# Patient Record
Sex: FEMALE | Race: BLACK OR AFRICAN AMERICAN | Employment: FULL TIME | URBAN - METROPOLITAN AREA
[De-identification: names, ages, dates, MRNs, and addresses within clinical notes are randomized per-mention and may not be internally consistent; named-entity substitution may affect disease eponyms.]

---

## 2017-06-15 LAB
ANTIBODY SCREEN, EXTERNAL: NORMAL
CHLAMYDIA, EXTERNAL: NORMAL
HBSAG, EXTERNAL: NORMAL
HIV, EXTERNAL: NORMAL
N. GONORRHEA, EXTERNAL: NORMAL
RUBELLA, EXTERNAL: NORMAL
T. PALLIDUM, EXTERNAL: NORMAL
TYPE, ABO & RH, EXTERNAL: NORMAL

## 2017-10-17 ENCOUNTER — HOSPITAL ENCOUNTER (INPATIENT)
Age: 22
LOS: 5 days | Discharge: HOME | DRG: 566 | End: 2017-10-24
Attending: OBSTETRICS & GYNECOLOGY | Admitting: OBSTETRICS & GYNECOLOGY
Payer: MEDICAID

## 2017-10-17 PROBLEM — Z34.90 PREGNANCY: Status: ACTIVE | Noted: 2017-10-17

## 2017-10-17 PROCEDURE — 96372 THER/PROPH/DIAG INJ SC/IM: CPT

## 2017-10-17 PROCEDURE — 74011250636 HC RX REV CODE- 250/636: Performed by: OBSTETRICS & GYNECOLOGY

## 2017-10-17 PROCEDURE — 74011250637 HC RX REV CODE- 250/637: Performed by: OBSTETRICS & GYNECOLOGY

## 2017-10-17 PROCEDURE — 59025 FETAL NON-STRESS TEST: CPT

## 2017-10-17 RX ORDER — BETAMETHASONE SODIUM PHOSPHATE AND BETAMETHASONE ACETATE 3; 3 MG/ML; MG/ML
12 INJECTION, SUSPENSION INTRA-ARTICULAR; INTRALESIONAL; INTRAMUSCULAR; SOFT TISSUE EVERY 24 HOURS
Status: DISCONTINUED | OUTPATIENT
Start: 2017-10-17 | End: 2017-10-19

## 2017-10-17 RX ORDER — AZITHROMYCIN 250 MG/1
500 TABLET, FILM COATED ORAL
Status: COMPLETED | OUTPATIENT
Start: 2017-10-17 | End: 2017-10-17

## 2017-10-17 RX ORDER — AZITHROMYCIN 250 MG/1
250 TABLET, FILM COATED ORAL DAILY
Status: DISCONTINUED | OUTPATIENT
Start: 2017-10-18 | End: 2017-10-24 | Stop reason: HOSPADM

## 2017-10-17 RX ADMIN — AZITHROMYCIN 500 MG: 250 TABLET, FILM COATED ORAL at 13:51

## 2017-10-17 RX ADMIN — BETAMETHASONE SODIUM PHOSPHATE AND BETAMETHASONE ACETATE 12 MG: 3; 3 INJECTION, SUSPENSION INTRA-ARTICULAR; INTRALESIONAL; INTRAMUSCULAR at 13:14

## 2017-10-17 NOTE — IP AVS SNAPSHOT
Summary of Care Report The Summary of Care report has been created to help improve care coordination. Users with access to [place] or [address] (Web-based application) may access additional patient information including the Discharge Summary. If you are not currently a [address] user and need more information, please call the number listed below in the [place] [address] section and ask to be connected with Medical Records. Facility Information Name Address Phone [address] [address] [phone] Patient Information Patient Name Sex  Steven Quintero ([id number]) Female 1995 Discharge Information Admitting Provider Service Area Unit Olayinka Hunter MD / [facility] 7[facility] Delivery / [phone] Discharge Provider Discharge Date/Time Discharge Disposition Destination (none) 10/24/2017 (Pending) AHR (none) Patient Language Language ENGLISH [13] Hospital Problems as of 10/24/2017  Reviewed: 10/14/2013 11:15 AM by Solo Munoz Class Noted - Resolved Last Modified POA Active Problems Pregnancy  10/17/2017 - Present 10/17/2017 by Declan Brown MD Unknown Entered by Declan Brown MD  
  Threatened  labor  10/19/2017 - Present 10/19/2017 by Olayinka Hunter MD Unknown Entered by Olayinka Hunter MD  
  
Non-Hospital Problems as of 10/24/2017  Reviewed: 10/14/2013 11:15 AM by Solo Munoz None You are allergic to the following No active allergies Current Discharge Medication List  
  
CONTINUE these medications which have NOT CHANGED Dose & Instructions Dispensing Information Comments PNV38-Iron Cbn&Gluc-FA-DSS-DHA 35-1- mg Cmpk Take  by mouth. Refills:  0 Current Immunizations Name Date DTAP Vaccine 2001, 3/31/2000, 1997, 2/10/1997 HIB Vaccine 3/31/2000, 1997, 1997, 2/10/1997 Hep A Vaccine 2 Dose Schedule (Ped/Adol) 10/14/2013 Hepatitis A Vaccine 10/2/2012 Hepatitis B Vaccine 3/31/2000, 1997, 1997, 2/10/1997 IPV 2001, 3/31/2000, 1997, 1997, 2/10/1997 Influenza Vaccine PF 10/14/2013 MMR Vaccine 2001, 2001, 3/31/2000 Meningococcal Vaccine 10/2/2012 TDAP Vaccine 2008 Varicella Virus Vaccine Live 10/2/2012, 2002, 2001 Follow-up Information Follow up With Details Comments Contact Info Keely Walters MD   [address] [facility] [address] [place] [id number] 
[phone] Discharge Instructions  Labor: Care Instructions Your Care Instructions  labor is the start of labor between 20 and 36 weeks of pregnancy. A full-term pregnancy lasts 37 to 42 weeks. In labor, the uterus contracts to open the cervix. This is the first stage of childbirth.  labor can be caused by a problem with the baby, the mother, or both. Often the cause is not known. In some cases, doctors use medicines to try to delay labor until 29 or more weeks of pregnancy. By this time, a baby has grown enough so that problems are not likely. In some casessuch as with a serious infectionit is healthier for the baby to be born early. Your treatment will depend on how far along you are in your pregnancy and on your health and your baby's health. Follow-up care is a key part of your treatment and safety. Be sure to make and go to all appointments, and call your doctor if you are having problems. It's also a good idea to know your test results and keep a list of the medicines you take. How can you care for yourself at home? · If your doctor prescribed medicines, take them exactly as directed.  Call your doctor if you think you are having a problem with your medicine. · Rest until your doctor advises you about activity. He or she will tell you if you should stay in bed most of the time. You may need to arrange for  if you have young children. · Do not have sexual intercourse unless your doctor says it is safe. · Use pads, not tampons, if you have vaginal bleeding. · Make sure to drink plenty of fluids. Dehydration can lead to contractions. If you have kidney, heart, or liver disease and have to limit fluids, talk with your doctor before you increase the amount of fluids you drink. · Do not smoke or allow others to smoke around you. If you need help quitting, talk to your doctor about stop-smoking programs and medicines. These can increase your chances of quitting for good. When should you call for help? Call 911 anytime you think you may need emergency care. For example, call if: 
· You passed out (lost consciousness). · You have severe vaginal bleeding. · You have severe pain in your belly or pelvis. · You have had fluid gushing or leaking from your vagina and you know or think the umbilical cord is bulging into your vagina. If this happens, immediately get down on your knees so your rear end (buttocks) is higher than your head. This will decrease the pressure on the cord until help arrives. Call your doctor now or seek immediate medical care if: 
· You have signs of preeclampsia, such as: 
¨ Sudden swelling of your face, hands, or feet. ¨ New vision problems (such as dimness or blurring). ¨ A severe headache. · You have any vaginal bleeding. · You have belly pain or cramping. · You have a fever. · You have had regular contractions (with or without pain) for an hour. This means that you have 6 or more within 1 hour after you change your position and drink fluids. · You have a sudden release of fluid from the vagina. · You have low back pain or pelvic pressure that does not go away. · You notice that your baby has stopped moving or is moving much less than normal. 
Watch closely for changes in your health, and be sure to contact your doctor if you have any problems. Where can you learn more? Go to http://[place]ia-nathalia.info/. Enter Q400 in the search box to learn more about \" Labor: Care Instructions. \" Current as of: 2017 Content Version: 11.3 © 3174-2923 [employer]. Care instructions adapted under license by [employer] (which disclaims liability or warranty for this information). If you have questions about a medical condition or this instruction, always ask your healthcare professional. [address] any warranty or liability for your use of this information. Chart Review Routing History Recipient Method Report Sent By Elke Helms MD  
Fax: [phone] Phone: [phone] Fax Mirtha Brennan MD NOTES AUTO ROUTING REPORT Ulises Perdomo MD [[id number]] 10/19/2017 11:14 AM 10/19/2017 Bacilio Helms MD  
Fax: [phone] Phone: [phone] Fax Mirtha Brennan MD NOTES AUTO ROUTING REPORT Ulises Perdomo MD [[id number]] 10/19/2017  6:00 PM 10/19/2017 Bacilio Helms MD  
Fax: [phone] Phone: [phone] Fax Mirtha Brennan MD NOTES AUTO ROUTING REPORT Shantelle Mccarthy MD [[id number]] 10/24/2017  2:32 PM 10/24/2017

## 2017-10-17 NOTE — PROGRESS NOTES
Admit this 23 yo G1 29 weeks with spontaneous twins from MD office for BMZ.  Consents signed and witnessed, patient oriented to L&D>

## 2017-10-17 NOTE — IP AVS SNAPSHOT
[address] [facility] 
[phone] Patient: Britni Ordaz MRN: [id number] :1995 My Medications TAKE these medications as instructed Instructions Each Dose to Equal  
 Morning Noon Evening Bedtime PNV38-Iron Cbn&Gluc-FA-DSS-DHA 35-1- mg Cmpk Your last dose was: Your next dose is: Take  by mouth.

## 2017-10-17 NOTE — PROGRESS NOTES
Bedside and Verbal shift change report given to Emi Storey RN (oncoming nurse) by Lyric Fischer RNC (offgoing nurse). Report included the following information SBAR, Intake/Output and Recent Results.

## 2017-10-17 NOTE — IP AVS SNAPSHOT
[address] [facility] 
[phone] Patient: Irina Xiao MRN: [id number] :1995 About your hospitalization You were admitted on:  2017 You last received care in the:  [facility] 3 LABOR & DELIVERY You were discharged on:  2017 Why you were hospitalized Your primary diagnosis was:  Not on File Your diagnoses also included:  Pregnancy, Threatened  Labor Things You Need To Do (next 8 weeks) Follow up with Lance Ayala MD  
  
Phone:  [phone] Where:  [address] [facility], [facility], [address] [place] [address] Discharge Orders None A check mak indicates which time of day the medication should be taken. My Medications TAKE these medications as instructed Instructions Each Dose to Equal  
 Morning Noon Evening Bedtime PNV38-Iron Cbn&Gluc-FA-DSS-DHA 35-1- mg Cmpk Your last dose was: Your next dose is: Take  by mouth. Discharge Instructions  Labor: Care Instructions Your Care Instructions  labor is the start of labor between 20 and 36 weeks of pregnancy. A full-term pregnancy lasts 37 to 42 weeks. In labor, the uterus contracts to open the cervix. This is the first stage of childbirth.  labor can be caused by a problem with the baby, the mother, or both. Often the cause is not known. In some cases, doctors use medicines to try to delay labor until 29 or more weeks of pregnancy. By this time, a baby has grown enough so that problems are not likely. In some casessuch as with a serious infectionit is healthier for the baby to be born early. Your treatment will depend on how far along you are in your pregnancy and on your health and your baby's health. Follow-up care is a key part of your treatment and safety.  Be sure to make and go to all appointments, and call your doctor if you are having problems. It's also a good idea to know your test results and keep a list of the medicines you take. How can you care for yourself at home? · If your doctor prescribed medicines, take them exactly as directed. Call your doctor if you think you are having a problem with your medicine. · Rest until your doctor advises you about activity. He or she will tell you if you should stay in bed most of the time. You may need to arrange for  if you have young children. · Do not have sexual intercourse unless your doctor says it is safe. · Use pads, not tampons, if you have vaginal bleeding. · Make sure to drink plenty of fluids. Dehydration can lead to contractions. If you have kidney, heart, or liver disease and have to limit fluids, talk with your doctor before you increase the amount of fluids you drink. · Do not smoke or allow others to smoke around you. If you need help quitting, talk to your doctor about stop-smoking programs and medicines. These can increase your chances of quitting for good. When should you call for help? Call 911 anytime you think you may need emergency care. For example, call if: 
· You passed out (lost consciousness). · You have severe vaginal bleeding. · You have severe pain in your belly or pelvis. · You have had fluid gushing or leaking from your vagina and you know or think the umbilical cord is bulging into your vagina. If this happens, immediately get down on your knees so your rear end (buttocks) is higher than your head. This will decrease the pressure on the cord until help arrives. Call your doctor now or seek immediate medical care if: 
· You have signs of preeclampsia, such as: 
¨ Sudden swelling of your face, hands, or feet. ¨ New vision problems (such as dimness or blurring). ¨ A severe headache. · You have any vaginal bleeding. · You have belly pain or cramping. · You have a fever. · You have had regular contractions (with or without pain) for an hour. This means that you have 6 or more within 1 hour after you change your position and drink fluids. · You have a sudden release of fluid from the vagina. · You have low back pain or pelvic pressure that does not go away. · You notice that your baby has stopped moving or is moving much less than normal. 
Watch closely for changes in your health, and be sure to contact your doctor if you have any problems. Where can you learn more? Go to http://[place]-nathalia.info/. Enter Q400 in the search box to learn more about \" Labor: Care Instructions. \" Current as of: 2017 Content Version: 11.3 © 0276-1640 [employer]. Care instructions adapted under license by [employer] (which disclaims liability or warranty for this information). If you have questions about a medical condition or this instruction, always ask your healthcare professional. [place] [address] any warranty or liability for your use of this information. Reelio Announcement We are excited to announce that we are making your provider's discharge notes available to you in Reelio. You will see these notes when they are completed and signed by the physician that discharged you from your recent hospital stay. If you have any questions or concerns about any information you see in Reelio, please call the Health Information Department where you were seen or reach out to your Primary Care Provider for more information about your plan of care. Introducing [facility]! Maria Luisa Zarco introduces Reelio patient portal. Now you can access parts of your medical record, email your doctor's office, and request medication refills online. 1. In your internet browser, go to [email or web address] 2. Click on the First Time User? Click Here link in the Sign In box.  You will see the New Member Sign Up page. 3. Enter your Koa.la Access Code exactly as it appears below. You will not need to use this code after youve completed the sign-up process. If you do not sign up before the expiration date, you must request a new code. · Koa.la Access Code: [email or web address] Expires: 1/22/2018  2:42 PM 
 
4. Enter the last four digits of your Social Security Number (xxxx) and Date of Birth (mm/dd/yyyy) as indicated and click Submit. You will be taken to the next sign-up page. 5. Create a Firefly Mobilet ID. This will be your Koa.la login ID and cannot be changed, so think of one that is secure and easy to remember. 6. Create a Koa.la password. You can change your password at any time. 7. Enter your Password Reset Question and Answer. This can be used at a later time if you forget your password. 8. Enter your e-mail address. You will receive e-mail notification when new information is available in [address] 9. Click Sign Up. You can now view and download portions of your medical record. 10. Click the Download Summary menu link to download a portable copy of your medical information. If you have questions, please visit the Frequently Asked Questions section of the Koa.la website. Remember, Koa.la is NOT to be used for urgent needs. For medical emergencies, dial 911. Now available from your iPhone and Android! Unresulted Labs-Please follow up with your PCP about these lab tests Order Current Status SAMPLE TO BLOOD BANK In process SAMPLE TO BLOOD BANK In process Providers Seen During Your Hospitalization Provider Specialty Primary office phone Fallon Jeffries MD Obstetrics & Gynecology [phone] Your Primary Care Physician (PCP) Primary Care Physician Office Phone Office Fax Violette Barone [phone] You are allergic to the following No active allergies Recent Documentation Height Weight Breastfeeding? BMI OB Status Smoking Status 1.575 m 76.2 kg Unknown 30.73 kg/m2 Pregnant Never Smoker Emergency Contacts Name Discharge Info Relation Home Work Mobile [address] CAREGIVER [3] Other Relative [6] [phone] Patient Belongings The following personal items are in your possession at time of discharge: 
  Dental Appliances: None  Visual Aid: None      Home Medications: None   Jewelry: None  Clothing: At bedside, Pants, Footwear, Shirt    Other Valuables: At bedside, Cell Phone Please provide this summary of care documentation to your next provider. Signatures-by signing, you are acknowledging that this After Visit Summary has been reviewed with you and you have received a copy. Patient Signature:  ____________________________________________________________ Date:  ____________________________________________________________  
  
Tyrone Carcamo Provider Signature:  ____________________________________________________________ Date:  ____________________________________________________________

## 2017-10-18 LAB
ERYTHROCYTE [DISTWIDTH] IN BLOOD BY AUTOMATED COUNT: 12.3 % (ref 11.5–14.5)
HCT VFR BLD AUTO: 31 % (ref 35–47)
HGB BLD-MCNC: 10 G/DL (ref 11.5–16)
MCH RBC QN AUTO: 28.6 PG (ref 26–34)
MCHC RBC AUTO-ENTMCNC: 32.3 G/DL (ref 30–36.5)
MCV RBC AUTO: 88.6 FL (ref 80–99)
PLATELET # BLD AUTO: 271 K/UL (ref 150–400)
RBC # BLD AUTO: 3.5 M/UL (ref 3.8–5.2)
WBC # BLD AUTO: 19.9 K/UL (ref 3.6–11)

## 2017-10-18 PROCEDURE — 85027 COMPLETE CBC AUTOMATED: CPT | Performed by: OBSTETRICS & GYNECOLOGY

## 2017-10-18 PROCEDURE — 74011250637 HC RX REV CODE- 250/637: Performed by: OBSTETRICS & GYNECOLOGY

## 2017-10-18 PROCEDURE — 96372 THER/PROPH/DIAG INJ SC/IM: CPT

## 2017-10-18 PROCEDURE — 99218 HC RM OBSERVATION: CPT

## 2017-10-18 PROCEDURE — 74011250636 HC RX REV CODE- 250/636: Performed by: OBSTETRICS & GYNECOLOGY

## 2017-10-18 PROCEDURE — 36415 COLL VENOUS BLD VENIPUNCTURE: CPT | Performed by: OBSTETRICS & GYNECOLOGY

## 2017-10-18 RX ORDER — LANOLIN ALCOHOL/MO/W.PET/CERES
1 CREAM (GRAM) TOPICAL
Status: DISCONTINUED | OUTPATIENT
Start: 2017-10-19 | End: 2017-10-24 | Stop reason: HOSPADM

## 2017-10-18 RX ORDER — SWAB
1 SWAB, NON-MEDICATED MISCELLANEOUS DAILY
Status: DISCONTINUED | OUTPATIENT
Start: 2017-10-18 | End: 2017-10-24 | Stop reason: HOSPADM

## 2017-10-18 RX ADMIN — Medication 1 TABLET: at 11:00

## 2017-10-18 RX ADMIN — BETAMETHASONE SODIUM PHOSPHATE AND BETAMETHASONE ACETATE 12 MG: 3; 3 INJECTION, SUSPENSION INTRA-ARTICULAR; INTRALESIONAL; INTRAMUSCULAR at 13:23

## 2017-10-18 RX ADMIN — AZITHROMYCIN 250 MG: 250 TABLET, FILM COATED ORAL at 11:35

## 2017-10-18 NOTE — PROGRESS NOTES
2305-Bedside report from [address] [place] [address], acre assumed at this time    2320-Assessment done, patient states no new complaints, ice pitcher refreshed at this time    0705-Bedside report to Cherelle Coelho RN, care turned over at this time

## 2017-10-18 NOTE — PROGRESS NOTES
0700 Bedside report received from Jose Manuel Acevedo RN and care assumed. 0745 Pt resting in bed in right lateral position with eyes shut. Respirations audible even and unlabored, no distress noted. 0900 Pt resting in bed watching TV. Pt denies any needs at this time. 1000 Pt OOB ambulated to bathroom without difficulty. 1100 Pt CBC and sample to blood bank drawn and sent to lab. Pt medicated with zithromax and prenatal vitamin per md orders. 1245 Pt sitting up in bed eating lunch. Pt denies any needs at this time. 1330 Pt medicated with betamethasone 12mg IM in right gluteal muscle. 1440 Pt resting in bed visiting with friends. Pt denies any needs at this time. 1500 Bedside report given to DOROTHY Herring RN and care turned over.

## 2017-10-18 NOTE — PROGRESS NOTES
Ante Partum Progress Note    Faiza Goodwin  [id number]    Assessment: 29w4d twins- vertex breech on US yesterday  Threatened pre-term labor with shortened cervix on US yesterday  Ureaplasma on vaginal culture    Plan:  Continue hospitalization with hospitalized bedrest   Complete  steroid course-2nd dose this afternoon   Ultrasound evaluation tomorrow with MFM   Ureaplasma-Azithromycin   Twins- desires attempt at vaginal delivery if twin A remains vertex   Anemia- on iron     Orders/Charges: Medium    Patient states she does not have headache , abdominal pain  , contractions, right upper quadrant pain  , vaginal bleeding , swelling, vaginal leaking of fluid  and reports active FM    Vitals:  Visit Vitals    /66 (BP 1 Location: Left arm, BP Patient Position: At rest)    Pulse 98    Temp 97.9 °F (36.6 °C)    Resp 18    Ht 5' 2\" (1.575 m)    Wt 76.2 kg (168 lb)    SpO2 98%    BMI 30.73 kg/m2     Temp (24hrs), Av °F (36.7 °C), Min:97.9 °F (36.6 °C), Max:98.1 °F (36.7 °C)      Last 24hr Input/Output:  No intake or output data in the 24 hours ending 10/18/17 0936         Exam:  Patient without distress. Abdomen, fundus soft non-tender     Extremities, no redness or tenderness               Additional Exam: Deferred    Labs:     No results found for: WBC, HGB, HCT, PLT, HGBEXT, HCTEXT, PLTEXT    No results found for this or any previous visit (from the past 24 hour(s)).

## 2017-10-19 PROBLEM — O47.00 THREATENED PRETERM LABOR: Status: ACTIVE | Noted: 2017-10-19

## 2017-10-19 PROCEDURE — 74011250637 HC RX REV CODE- 250/637: Performed by: OBSTETRICS & GYNECOLOGY

## 2017-10-19 PROCEDURE — 74011250636 HC RX REV CODE- 250/636: Performed by: OBSTETRICS & GYNECOLOGY

## 2017-10-19 PROCEDURE — 75410000002 HC LABOR FEE PER 1 HR

## 2017-10-19 PROCEDURE — 65410000002 HC RM PRIVATE OB

## 2017-10-19 PROCEDURE — 99218 HC RM OBSERVATION: CPT

## 2017-10-19 RX ORDER — MAGNESIUM SULFATE HEPTAHYDRATE 40 MG/ML
4 INJECTION, SOLUTION INTRAVENOUS ONCE
Status: COMPLETED | OUTPATIENT
Start: 2017-10-19 | End: 2017-10-19

## 2017-10-19 RX ORDER — SODIUM CHLORIDE 9 MG/ML
INJECTION INTRAMUSCULAR; INTRAVENOUS; SUBCUTANEOUS
Status: DISPENSED
Start: 2017-10-19 | End: 2017-10-20

## 2017-10-19 RX ORDER — CALCIUM GLUCONATE 94 MG/ML
1 INJECTION, SOLUTION INTRAVENOUS AS NEEDED
Status: DISCONTINUED | OUTPATIENT
Start: 2017-10-19 | End: 2017-10-22 | Stop reason: ALTCHOICE

## 2017-10-19 RX ORDER — MAGNESIUM SULFATE HEPTAHYDRATE 40 MG/ML
2 INJECTION, SOLUTION INTRAVENOUS ONCE
Status: COMPLETED | OUTPATIENT
Start: 2017-10-19 | End: 2017-10-19

## 2017-10-19 RX ORDER — SODIUM CHLORIDE, SODIUM LACTATE, POTASSIUM CHLORIDE, CALCIUM CHLORIDE 600; 310; 30; 20 MG/100ML; MG/100ML; MG/100ML; MG/100ML
75 INJECTION, SOLUTION INTRAVENOUS CONTINUOUS
Status: DISCONTINUED | OUTPATIENT
Start: 2017-10-19 | End: 2017-10-22 | Stop reason: ALTCHOICE

## 2017-10-19 RX ADMIN — MAGNESIUM SULFATE HEPTAHYDRATE 4 G: 40 INJECTION, SOLUTION INTRAVENOUS at 16:15

## 2017-10-19 RX ADMIN — FERROUS SULFATE TAB 325 MG (65 MG ELEMENTAL FE) 325 MG: 325 (65 FE) TAB at 08:05

## 2017-10-19 RX ADMIN — Medication 2 G/HR: at 22:37

## 2017-10-19 RX ADMIN — Medication 2 G/HR: at 17:02

## 2017-10-19 RX ADMIN — Medication 1 TABLET: at 08:05

## 2017-10-19 RX ADMIN — AZITHROMYCIN 250 MG: 250 TABLET, FILM COATED ORAL at 08:05

## 2017-10-19 RX ADMIN — SODIUM CHLORIDE, SODIUM LACTATE, POTASSIUM CHLORIDE, AND CALCIUM CHLORIDE 75 ML/HR: 600; 310; 30; 20 INJECTION, SOLUTION INTRAVENOUS at 16:15

## 2017-10-19 RX ADMIN — MAGNESIUM SULFATE HEPTAHYDRATE 2 G: 40 INJECTION, SOLUTION INTRAVENOUS at 16:31

## 2017-10-19 NOTE — PROGRESS NOTES
Ante Partum Progress Note    Sam Coelho  [id number]    Assessment: 34w10d jesús twins - vertex/breech    Short cervix - 9mm on US 10/17    Ureaplasma on vaginal culture      Plan:  Continue hospitalization with hospitalized bedrest             Sp betamethasone course   Ultrasound today to eval cervix - If cervix stable will plan dc home with bedrest              Continue 5 day course of azithromycin   Anemia on iron     Orders/Charges: Medium    Patient states she has no new complaints. She denies any contractions, vaginal bleeding, leakage of fluid. Vitals:  Visit Vitals    /69 (BP 1 Location: Right arm, BP Patient Position: At rest)    Pulse 93    Temp 98.3 °F (36.8 °C)    Resp 16    Ht 5' 2\" (1.575 m)    Wt 76.2 kg (168 lb)    SpO2 98%    Breastfeeding Unknown    BMI 30.73 kg/m2     Temp (24hrs), Av.1 °F (36.7 °C), Min:97.7 °F (36.5 °C), Max:98.3 °F (36.8 °C)      Last 24hr Input/Output:  No intake or output data in the 24 hours ending 10/19/17 1113     Non stress test:  Reactive    Patient Vitals for the past 4 hrs: Mode Fetal Heart Rate Fetal Activity Variability Decelerations Accelerations RN Reviewed Strip? Non Stress Test   10/19/17 0840 External 140 Present 6-25 BPM None Yes Yes Reactive    Patient Vitals for the past 4 hrs: Mode Fetal Heart Rate Fetal Activity Variability Decelerations Accelerations RN Reviewed Strip? Non Stress Test   10/19/17 0840 External 140 Present 6-25 BPM None Yes Yes Reactive        Uterine Activity: One contraction in 30 min    Exam:  Patient without distress.      Abdomen, fundus soft non-tender     Extremities, no redness or tenderness               Additional Exam: Deferred    Labs:     Lab Results   Component Value Date/Time    WBC 19.9 10/18/2017 11:14 AM    HGB 10.0 10/18/2017 11:14 AM    HCT 31.0 10/18/2017 11:14 AM    PLATELET 913  11:14 AM       Recent Results (from the past 24 hour(s))   CBC W/O DIFF    Collection Time: 10/18/17 11:14 AM Result Value Ref Range    WBC 19.9 (H) 3.6 - 11.0 K/uL    RBC 3.50 (L) 3.80 - 5.20 M/uL    HGB 10.0 (L) 11.5 - 16.0 g/dL    HCT 31.0 (L) 35.0 - 47.0 %    MCV 88.6 80.0 - 99.0 FL    MCH 28.6 26.0 - 34.0 PG    MCHC 32.3 30.0 - 36.5 g/dL    RDW 12.3 11.5 - 14.5 %    PLATELET 222 259 - 502 K/uL

## 2017-10-19 NOTE — PROGRESS NOTES
1900-Bedside report from [address] [facility], care assumed at this time.  Patient on monitor for NST, NST reactive for gestation, assessment done, no new complaints, FHR baby \"A\" 130 BPM and baby \"B\" 135 BPM    0705-Bedside report to HOLDEN Horowitz RN, care turned over at this time

## 2017-10-19 NOTE — PROGRESS NOTES
Patient examined at approximately 6pm  Cervix remains closed, very soft, 70%  FHT cat I x 2  Contractions q 12-20 min, not appreciated by patient  Reviewed if labors, at this gestational age, with most recent EFW 26g and 1069g on 10/4, that I would recommend delivery via c/s given the most recent documented EFW <1500g and my and Dr Lopez comfort with breech extraction. She agrees with c/s if labors.

## 2017-10-19 NOTE — PROGRESS NOTES
1600: Pt transferred to  3163 to start magnesium per Dr. Nannette Angela. 1615: Magnesium 6 gm loading dose started. No adverse reactions noted.

## 2017-10-19 NOTE — PROGRESS NOTES
Initial Nutrition Assessment:    INTERVENTIONS/RECOMMENDATIONS:   · Meals/Snacks: General/healthful diet: Continue current diet    ASSESSMENT:   Patient medically noted for pregnancy with twins and threatened  labor. Receiving a L&D diet. Encourage intake of meals, snacks, and fluids. Diet Order:  (L&D)  % Eaten:  No data found. Pertinent Medications: [x]Reviewed []Other: Ferrous sulfate, Mg Sulfate, Prenatal MVI  Pertinent Labs: [x]Reviewed []Other:   Food Allergies: [x]None []Other   Last BM: 10/17  [x]Active     []Hyperactive  []Hypoactive       [] Absent BS  Skin:    [x] Intact   [] Incision  [] Breakdown  [] Other:    Anthropometrics:   Height: 5' 2\" (157.5 cm) Weight: 76.2 kg (168 lb)   IBW (%IBW):   ( ) UBW (%UBW):   (  %)   Last Weight Metrics:  Weight Loss Metrics 10/17/2017 10/14/2013 10/2/2012   Today's Wt 168 lb 129 lb 122 lb 4 oz   BMI 30.73 kg/m2 23.53 kg/m2 22.96 kg/m2       BMI: Body mass index is 30.73 kg/(m^2). This BMI is indicative of:   []Underweight    []Normal    []Overweight    [x] Obesity   [] Extreme Obesity (BMI>40)     Estimated Nutrition Needs (Based on):   2428 Kcals/day (BMR (1483) x 1.3AF +500kcal) , 99 g (1.3 g/kg bw) Protein  Carbohydrate:  At Least 130 g/day  Fluids: 2400 mL/day (1ml/kcal)    Pt expected to meet estimated nutrient needs: [x]Yes []No    NUTRITION DIAGNOSES:   Problem:  Increased nutrient needs      Etiology: related to pregnancy     Signs/Symptoms: as evidenced by Twins      NUTRITION INTERVENTIONS:  Meals/Snacks: General/healthful diet                  GOAL:   PO intake >70% of meals/snacks next 5-7 days    LEARNING NEEDS (Diet, Food/Nutrient-Drug Interaction):    [x] None Identified   [] Identified and Education Provided/Documented   [] Identified and Pt declined/was not appropriate     Cultureal, Mu-ism, OR Ethnic Dietary Needs:    [x] None Identified   [] Identified and Addressed     [x] Interdisciplinary Care Plan Reviewed/Documented    [x] Discharge Planning:  Regular diet     MONITORING /EVALUATION:   Food/Nutrient Intake Outcomes:  Total energy intake  Physical Signs/Symptoms Outcomes: Weight/weight change    NUTRITION RISK:    [] High              [] Moderate           [x]  Low  []  Minimal/Uncompromised    PT SEEN FOR:    []  MD Consult: []Calorie Count      []Diabetic Diet Education        []Diet Education     []Electrolyte Management     []General Nutrition Management and Supplements     []Management of Tube Feeding     []TPN Recommendations    [x]  RN Referral:  []MST score >=2     []Enteral/Parenteral Nutrition PTA     [x]Pregnant: Gestational DM or Multigestation     []Pressure Ulcer/Wound Care needs        []  Low BMI  []  DTR Referral       [employer]  Pager [phone]                 Weekend Pager [phone]

## 2017-10-19 NOTE — H&P
ADDENDUM:  Chart reviewed - states twin B with cystic hygroma and bowel herniation - this was noted at 11 week scan and anatomy was normal at all follow up visits other than umbilical cord cyst. [id number] neg, twin boys. EDC:2017  EGA: 29 weeks, 3 days      Vital Signs   22Years Old Female  Weight: 168 pounds  BP:       116/72    Pap/HPV/Gardasil History   History of abnormal pap: no  Gardasil Injection History: Declined    Patient's Prenatal Care with Doctor of Record Haroon Swenson MD Notable For -    Anemia on FE pregnant  Shortened cervix 17mm 27wks   lab screening  Abnormality in fetus know or suspected. twin B, cystic hygroma, bowel herniation-MaterniT21-screen neg  Twins dichorionic/diamniotic              Past Pregnancy History      :  1    Pregnancy # 1     Comments:  current-di/di twins-spontaneous        Allergies    This patient has no known allergies. Medications Removed from Medication List        [address] for Follow-up Visit     Estimated weeks of        gestation:  29 3/7     Weight:  168     Blood pressure: 116 / 72     Urine Protein:  N     Urine Glucose: N     Headache:  No     Nausea/vomiting: No     Edema:  0     Vaginal bleeding: no     Vaginal discharge: d/c     Fetal activity:  yes x 2     FHR:   150/135     Labor symptoms: no     Fetal position:  cephalic/breech     Cx Dilation:  0     Cx Effacement: 80%     Cx Station:  ballotable     Preceptor:  aym     Comment:  normal glucola. Anemia   add daily iron  US today 9mm CL with Y-funnel. Pror neg FFN. closed cvx today. soft and paper thin    Prior ureaplasm +, will treat. did not pickup prior rx. admit for BMZ repeat CL on Thursday         Fetal Surveillance      NST Fetus A  An NST was performed and was reactive. The baseline FHR was 140 and regular. Moderate variablity was present. Three accelerations of sufficient amplitude and duration were noted. There were no decelerations noted.      NST Fetus B  An NST was performed and was reactive. The baseline FHR was 150 and regular. Moderate variablity was present. Three accelerations of sufficient amplitude and duration were noted. no contractions on monitor . Impression & Recommendations:    Problem # 1:  Shortened cervix 17mm 27wks (ICD-649.73) (RFP43-B73.873)  CL now 9mm with Y-shaped funnel   closed on exam   But soft and thin   Prior neg FFN 1 week ago   ureaplasm +, will treat with PO antibitoics   Admit to L&D for  steriods   Plan repeat CL on Thursday   Discussed expectation for home on bedrest at time of discharge   Discussed with Dr Lester Sanchez, agrees with plan  report called to charge nurse. Dr Cain Caballero aware     Orders:  Fetal non-stress test (CPT-19021B)  Fetal non-stress test twins ([id number])  Antepartum Care (CPT-10)  Patient Sent to L&D (CPT-LD)  Sent to L&D  (CPT-AdmitF)      Problem # 2:  Anemia on FE pregnant (ICD-648.23) (GRA64-Y20.013)  Hgb 9.4  Start iron daily   continue prenatal vitamin         Medications (at conclusion of this visit)    08/15/2017 PNV-SELECT 27-0.6-0.4 MG ORAL TABS (PRENATAL VIT W/FU-[id number]-XT) one tablet orally every day  06/15/2017 DICLEGIS 10-10 MG TBEC (DOXYLAMINE-PYRIDOXINE) two tabs qhs, may add one in the am and one in the afternoon if needed          CC:  short cervix . History of Present Illness:  CL 9mm, twin pregnnacy    Admit to antepartum for  steriod course. Plan repeat CL on Thursday   If stable can be discharged and done as outpatient Otherwise will scan as inpatient   recent ureaplasm +, needs PO antibiotics to treat   US today cephalic/breech.  Desires vaginal delivery if labors           Past Medical History:     Reviewed history from 06/15/2017 and no changes required:        Negative     Past Surgical History:     Reviewed history from 06/15/2017 and no changes required:        Negative     Family History Summary:      Reviewed history Last on 2017 and no changes required:10/17/2017      General Comments - FH:  Family history transferred to 2 compliant     Social History:     Reviewed history from 06/15/2017 and no changes required:        Single      Risk Factors:     PAP Smear History:      Date of Last PAP Smear:  06/15/2017     Results:  Deferred     Previous Tobacco Use: Signed On - 06/15/2017  Smoked Tobacco Use:  Never smoker  Smokeless Tobacco Use:  Never  Passive smoke exposure:  no  Drug use:  no  Caffeine use:  <! drinks per day    Previous Alcohol Use: Signed On - 06/15/2017  Alcohol use:  no  Exercise:  no  Seatbelt use:  preg- %  Sun Exposure:  rarely    PAP Smear History:      Date of Last PAP Smear:  06/15/2017     Results:  Deferred       Review of Systems        See HPI    Except as noted in the HPI, the review of systems is negative for General, Breast, , Resp, GI, Endo, MS, Psych and Heme.       Vital Signs    Blood Pressure: 116 / 72  Contractions:  no  NST:   reactive     Weight:  168 pounds      Physical Exam     General           General appearance:  no acute distress    Head           Inspection:   normal    Eyes           External:   EOM intact    ENT           Dental:   adequate dentition    Chest           Lungs:  clear to auscultation          Heart:  regular rate and rhythm    Extremeties           Extremeties:  0 edema    Neurological           Reflexes:  2+ and symmetric with no pathological reflexes    Psych           Orientation:  oriented to time, place, and person          Mood:  no appearance of anxiety, depression, or agitation    Lymph           Inguinal:  no inguinal adenopathy    Skin           Inspection:  no rashes, suspicious lesions, or ulcerations    Abdomen           Abdomen:  gravid--twins     Pelvic Exam           EGBUS:  no lesions          Vagina:  thin white discharge           Uterus:  gravid          Cervix:  no lesions or discharge                  Dilation: : 0                  Effacement:  80% Station:  ballotable                  Presentation:  cephalic/breech                  Membranes:  intact                  Pelvis:  adequate                  Consistency:  soft                  Position: anterior    Other Physical Exam Findings           US today reviewed     Allergies    This patient has no known allergies. Medications Removed from Medication List        Impression & Recommendations:    Problem # 1:  Shortened cervix 17mm 27wks (ICD-649.73) (DPQ78-S91.873)  CL now 9mm with Y-shaped funnel   closed on exam   But soft and thin   Prior neg FFN 1 week ago   ureaplasm +, will treat with PO antibitoics   Admit to L&D for  steriods   Plan repeat CL on Thursday   Discussed expectation for home on bedrest at time of discharge   Discussed with Dr Su, agrees with plan  report called to charge nurse. Dr Heena Kramer aware     Orders:  Fetal non-stress test (CPT-61968D)  Fetal non-stress test twins ([id number])  Antepartum Care (CPT-10)  Patient Sent to L&D (CPT-LD)  Sent to L&D  (CPT-AdmitF)      Problem # 2:  Anemia on FE pregnant (ICD-648.23) (PSE85-T13.013)  Hgb 9.4  Start iron daily   continue prenatal vitamin         Medications (at conclusion of this visit)    08/15/2017 PNV-SELECT 27-0.6-0.4 MG ORAL TABS (PRENATAL VIT W/BE-QVRDPTCMR-IJ) one tablet orally every day  06/15/2017 DICLEGIS 10-10 MG TBEC (DOXYLAMINE-PYRIDOXINE) two tabs qhs, may add one in the am and one in the afternoon if needed          LABORATORY DATA   TEST DATE RESULT   Group B Strep culture                                     (Group B Strep Culture Result Field)   Blood Type 06/15/2017 O                                             (Blood Type Result Field)   Rh 06/15/2017 Positive                                   (Rh Result Field)   Rhogam Inj Given     Tdap Vaccine Given 10/10/2017 Vacc.  [address]   Antibody Screen 10/10/2017 Negative   Rubella  [facility] Reference Ranges On or After 3/10/14                  <0.90 Non-immune      0.90 - 0.99     Equivocal      >0.99              Immune    [facility] Reference Ranges  Before 3/10/14           <5                 Non-immune             5 - 9               Equivocal            >9                 Immune  [facility] Reference Ranges       < Or = 0.90       Negative             0.91-1.09          Equivocal            > Or = 1.10       Positive   06/15/2017     5.94     TPA (T Pallidum Antibodies) 10/10/2017 Negative   Serology (RPR)     HBsAg 06/15/2017 Negative   HIV 06/15/2017 Non Reactive   Hemoglobin 10/10/2017 9.7   Hematocrit 10/10/2017 29.3   Platelets 30/57/8756 252 X10E3/UL   TSH     Urine Culture 06/15/2017 Negative   GC DNA Probe 06/15/2017 Negative   Chlamydia DNA 06/15/2017 Negative   PAP     Flu Vaccine Given 09/12/2017 Vacc.  VWC   HGBA1C     HGB Electro 06/15/2017 AA   T4, Free     BG Fasting     GTT 1H 50G 10/10/2017 111   GTT 1H 100G     GTT 2H 100G     GTT 3H 100G     Glucose Plasma     CF Accept or Decline 06/21/2017 declined   CF Screen Result     Nuchal Trans 06/21/2017 2.3^2.3 mm&millimeters   AFP Only 08/15/2017 *Screen Negative*   Tetra     AFP Serum     CVS 06/21/2017 declined   AFP Amniotic     Amnio Karyo     FISH     GC Culture     Chlamydia Cult     Ureaplasma 10/04/2017 Positive   Mycoplasma 10/04/2017 Negative   WBC 06/15/2017 12.2 X10E3/UL   RBC 06/15/2017 3.95 X10E6/UL   MCV 06/15/2017 89   MCH 06/15/2017 29.4   MCHC RBC 06/15/2017 33.0     ULTRASOUND DATA   TEST DATE RESULT   Estimated Fetal Weight 10/04/2017 6120.3720137^8498 g&grams                                     Weight % 10/04/2017 31^31% %&percent                                                SANTO                      BPP 10/10/2017 8^8 [n/a]&Not applicable   Cervical Length (mm)       ]      Electronically signed by Beltran Daigle MD on 10/17/2017 at 11:25 AM    ________________________________________________________________________

## 2017-10-19 NOTE — PROGRESS NOTES
Bedside shift change report given to Carole Hernandez RN by SURYA Medellin RN. Report included the following information SBAR, Kardex, Intake/Output, MAR, Accordion, Recent Results and Med Rec Status. Hourly Rounding performed by JARROD.   Casey Broderick RN

## 2017-10-19 NOTE — PROGRESS NOTES
0840: NST reactive for twins, but at times appeared to be only recording baby A.  Will try NST after patient eats breakfast.

## 2017-10-19 NOTE — PROGRESS NOTES
1914 - Bedside and Verbal shift change report given to Lea Haddad RN (oncoming nurse) by SURYA Leblanc RN (offgoing nurse). Report included the following information SBAR, Intake/Output, MAR and Recent Results. 1918 - Pt w/out complaint at this time and visiting with a friend now. 2014 - Noted increased frequency of contractions on monitor and by palpation. Pt states she feels no pain, pressure, or discomfort. Encouraged pt to void soon if she feels the urge. Will continue to monitor closely. Dr. Meera Schrader aware. 2117 - Pt sitting up to eat. 2143 - Pt OOB to use bedside commode. Voided 380ml. 2313 - Bedside and Verbal shift change report given to SURYA Siu RN (oncoming nurse) by Lea Haddad RN (offgoing nurse). Report included the following information SBAR, Intake/Output, MAR and Recent Results.

## 2017-10-19 NOTE — PROGRESS NOTES
US shows further shortening of cervix to 4mm  NST with irritability and intermittent contractions  Has not yet had neuroprotective magnesium  Given worsening of TVCL and irritability will plan Mg for neuroprotection  Will get NICU consult

## 2017-10-20 PROCEDURE — 75410000002 HC LABOR FEE PER 1 HR

## 2017-10-20 PROCEDURE — 65410000002 HC RM PRIVATE OB

## 2017-10-20 PROCEDURE — 74011250637 HC RX REV CODE- 250/637: Performed by: OBSTETRICS & GYNECOLOGY

## 2017-10-20 PROCEDURE — 74011250636 HC RX REV CODE- 250/636: Performed by: OBSTETRICS & GYNECOLOGY

## 2017-10-20 RX ORDER — SODIUM CHLORIDE 0.9 % (FLUSH) 0.9 %
SYRINGE (ML) INJECTION
Status: COMPLETED
Start: 2017-10-20 | End: 2017-10-20

## 2017-10-20 RX ADMIN — Medication 2 G/HR: at 03:26

## 2017-10-20 RX ADMIN — Medication 1 TABLET: at 13:21

## 2017-10-20 RX ADMIN — SODIUM CHLORIDE, SODIUM LACTATE, POTASSIUM CHLORIDE, AND CALCIUM CHLORIDE 75 ML/HR: 600; 310; 30; 20 INJECTION, SOLUTION INTRAVENOUS at 03:27

## 2017-10-20 RX ADMIN — Medication 10 ML: at 22:37

## 2017-10-20 RX ADMIN — FERROUS SULFATE TAB 325 MG (65 MG ELEMENTAL FE) 325 MG: 325 (65 FE) TAB at 13:21

## 2017-10-20 RX ADMIN — AZITHROMYCIN 250 MG: 250 TABLET, FILM COATED ORAL at 13:21

## 2017-10-20 NOTE — PROGRESS NOTES
Report received from Anel Ladd RN. Care assumed at this time. 6555- Adjusting all three monitors. Pt desired to change positions    2730 Report given to CHINO Li. Care turned over at this time.

## 2017-10-20 NOTE — PROGRESS NOTES
Late entry:  H&P by Dr. Ene Mckenzie entered on 10/17. Pt seen and examined, chart reviewed. PMH and PSH as well as social and family history are reviewed. She is admitted for shortened cervix with twin gestation at 29 weeks 2 days. Visit Vitals    /76    Pulse 89    Temp 97.7 °F (36.5 °C)    Resp 18    Ht 5' 2\" (1.575 m)    Wt 76.2 kg (168 lb)    SpO2 99%    Breastfeeding Unknown    BMI 30.73 kg/m2      NST is reactive x 2, there are no contractions. Lungs CTAB  CV RRR  Abd gravid, nontender  Ext no edema. Cx not eacxmined, found to be 9 mm with funneling in office on US day of admission. 21 yo G1 at 29 2/7 on admission with twin pregnancy and shortened cervix. Begin BMZ, hold on tocolysis due to no contractile activity. General diet, bedrest with BRP.     Azithro for ureaplasma culture on previous admission

## 2017-10-20 NOTE — PROGRESS NOTES
Bedside shift change report given to JARROD Martinez by SURYA Weldon RN. Report included the following information SBAR, Kardex, Intake/Output, MAR, Accordion, Recent Results and Med Rec Status. Hourly Rounding performed by JARROD.   Nancy Lowry RN

## 2017-10-20 NOTE — PROGRESS NOTES
Ante Partum Progress Note    Faithcheli Kelley  24M4E    Assessment: 29w6d   Twins, short cervix, threatened ptl s/p bmz and magnesium for neuro prophylaxis    Plan:  Continue hospitalization with hospitalized bedrest and Maternal fetal medicine consultation next week on Tuesday. Orders/Charges: Medium and Non Stress Test  X 2    Patient states she has no new complaints    Vitals:  Visit Vitals    /65    Pulse 99    Temp 97.8 °F (36.6 °C)    Resp 16    Ht 5' 2\" (1.575 m)    Wt 76.2 kg (168 lb)    SpO2 100%    Breastfeeding Unknown    BMI 30.73 kg/m2     Temp (24hrs), Av.9 °F (36.6 °C), Min:97.7 °F (36.5 °C), Max:98.4 °F (36.9 °C)      Last 24hr Input/Output:    Intake/Output Summary (Last 24 hours) at 10/20/17 1755  Last data filed at 10/20/17 1323   Gross per 24 hour   Intake          2550.42 ml   Output             2080 ml   Net           470.42 ml        Non stress test:  Reactive  An NST was performed and was reactive x2. The baseline FHR was appropriate Moderate baseline  variability was noted. Accelerations of sufficient amplitude and duration were noted. There were no decelerations noted. No data found. No data found. Uterine Activity: None     Exam:  Patient without distress. Abdomen, fundus soft non-tender     Extremities, no redness or tenderness               Additional Exam: Deferred    Labs:     Lab Results   Component Value Date/Time    WBC 19.9 10/18/2017 11:14 AM    HGB 10.0 10/18/2017 11:14 AM    HCT 31.0 10/18/2017 11:14 AM    PLATELET 500 3217 11:14 AM       No results found for this or any previous visit (from the past 24 hour(s)).

## 2017-10-20 NOTE — CONSULTS
Neonatology Antepartum Consultation    Name: Aubrey Ramon      Medical Record Number: [id number]      YOB: 1995     Today's Date: 2017                                                                 Date of Consultation:  2017  Time: 8:03 PM  Attending MD: faith  Referring Physician: Bebe  Reason for Consultation:  Premature twins    Subjective:     Age: 22 y.o.  Saumya Washington  Para:   Gestation age: 34w10d      Maternal steroids:  yes     Objective:     Medications:   Current Facility-Administered Medications   Medication Dose Route Frequency    magnesium sulfate 10 gm/250 mL D5W infusion  2 g/hr IntraVENous CONTINUOUS    calcium gluconate injection 1 g  1 g IntraVENous PRN    sodium chloride 0.9 % injection        lactated Ringers infusion  75 mL/hr IntraVENous CONTINUOUS    prenatal vit-iron fumarate-fa (PRENATAL PLUS with IRON) tablet 1 Tab  1 Tab Oral DAILY    ferrous sulfate tablet 325 mg  1 Tab Oral DAILY WITH BREAKFAST    azithromycin (ZITHROMAX) tablet 250 mg  250 mg Oral DAILY     Rupture of Membrane: Membranes  Membrane Status: Intact   Meconium Stained:       Data Review:  Lab:   Lab Results   Component Value Date/Time    Rubella, External 5.94 IMM 06/15/2017    HBsAg, External neg 06/15/2017    HIV, External neg 06/15/2017    Gonorrhea, External neg 06/15/2017    Chlamydia, External neg 06/15/2017    ABO,Rh O pos 06/15/2017    Antibody screen, External neg 06/15/2017     Last Ultrasound:{  Last Estimated Fetal Weight: 2.5 Lbs per mom    Assessment:  Twins at 34 5/7 weeks. Spoke with mom concerning potential respiratory issues,feeding issues,IVH and routine care of premature newborns. She voiced no questions at this time. Time spent face to face 15 minutes.   Time spent reviewing chart  And discussing patient with Dr. Makeda Rodriguez minutes      Active Problems:    Pregnancy (10/17/2017)      Threatened  labor (10/19/2017)        OB Concerns/Plan: Approximate survival statistics in overall population at this Gestation Age 34w10d    [x]   Explained limitation of statistics to parents    Common problems at this Gestation Age: 29w5d      However, not limited to the above.      [x]    Explained NICU coverage and team approach  [x]    Answered question  []    Offered tour  []    Obtained consents  [x]    Discussed Benefits of Breast Feeding  []    Discussed the Parent Progress note      Signed: [email or web address]  Date: 10/19/17  Time: 2007

## 2017-10-21 PROCEDURE — 74011250637 HC RX REV CODE- 250/637: Performed by: OBSTETRICS & GYNECOLOGY

## 2017-10-21 PROCEDURE — 65410000002 HC RM PRIVATE OB

## 2017-10-21 RX ADMIN — FERROUS SULFATE TAB 325 MG (65 MG ELEMENTAL FE) 325 MG: 325 (65 FE) TAB at 09:42

## 2017-10-21 RX ADMIN — Medication 1 TABLET: at 09:42

## 2017-10-21 RX ADMIN — AZITHROMYCIN 250 MG: 250 TABLET, FILM COATED ORAL at 09:42

## 2017-10-21 NOTE — PROGRESS NOTES
Ante Partum Progress Note    Juan Pablo Sin  30w0d    Assessment: 30w0d   Twin gestation, short cervix (3.7 mm)  Threatened ptl s/p bmz and neuroprotective mag    Plan:  Continue hospitalization with hospitalized bedrest and Maternal fetal medicine consultation and utlrasound Tuesday. If feeling contractions would consider repeat magnesium or procardia. Orders/Charges: Medium and Non Stress Test  X 2    Patient states she has no new complaints. She does not feel any contractions. Good fetal movement. No change in vaginal d/c    Vitals:  Visit Vitals    /70 (BP 1 Location: Left arm, BP Patient Position: Sitting)    Pulse (!) 113    Temp 98.2 °F (36.8 °C)    Resp 18    Ht 5' 2\" (1.575 m)    Wt 76.2 kg (168 lb)    SpO2 97%    Breastfeeding Unknown    BMI 30.73 kg/m2     Temp (24hrs), Av.1 °F (36.7 °C), Min:97.8 °F (36.6 °C), Max:98.3 °F (36.8 °C)      Last 24hr Input/Output:    Intake/Output Summary (Last 24 hours) at 10/21/17 1111  Last data filed at 10/20/17 1323   Gross per 24 hour   Intake                0 ml   Output              900 ml   Net             -900 ml        Non stress test:  Reactive    Patient Vitals for the past 4 hrs: Mode Fetal Heart Rate Fetal Activity Variability Decelerations Accelerations RN Reviewed Strip?   10/21/17 1031 External 135 Present 6-25 BPM None Yes Yes    Patient Vitals for the past 4 hrs: Mode Fetal Heart Rate Fetal Activity Variability Decelerations Accelerations RN Reviewed Strip?   10/21/17 1031 External 135 Present 6-25 BPM None Yes Yes        Uterine Activity: Mild and Irregular     Exam:  Patient without distress.      Abdomen, fundus soft non-tender     Extremities, no redness or tenderness               Additional Exam: Deferred    Labs:     Lab Results   Component Value Date/Time    WBC 19.9 10/18/2017 11:14 AM    HGB 10.0 10/18/2017 11:14 AM    HCT 31.0 10/18/2017 11:14 AM    PLATELET 085  11:14 AM       No results found for this or any previous visit (from the past 24 hour(s)).

## 2017-10-21 NOTE — PROGRESS NOTES
1230: Pt resting comfortably in bed, no needs expressed at this time, pt aware of POC. 1500: pt resting comfortably, provided with drinks, fresh water, towels and gown.  No further needs expressed at this time

## 2017-10-21 NOTE — PROGRESS NOTES
Bedside report received from [facility]  and care assumed. Report given with SBAR , recent labs, last cervical exam  and MAR . Pt in room with family members denies c/o pain or abd discomfort .

## 2017-10-21 NOTE — PROGRESS NOTES
Bedside shift change report given to Jake GARAY and Milagros Alcazar  (oncoming nurse) by me (offgoing nurse). Report given with SBAR, MAR and Recent Results.

## 2017-10-22 PROCEDURE — 65410000002 HC RM PRIVATE OB

## 2017-10-22 PROCEDURE — 59025 FETAL NON-STRESS TEST: CPT

## 2017-10-22 PROCEDURE — 36415 COLL VENOUS BLD VENIPUNCTURE: CPT | Performed by: OBSTETRICS & GYNECOLOGY

## 2017-10-22 PROCEDURE — 74011250637 HC RX REV CODE- 250/637: Performed by: OBSTETRICS & GYNECOLOGY

## 2017-10-22 RX ADMIN — Medication 1 TABLET: at 10:40

## 2017-10-22 RX ADMIN — FERROUS SULFATE TAB 325 MG (65 MG ELEMENTAL FE) 325 MG: 325 (65 FE) TAB at 10:40

## 2017-10-22 RX ADMIN — AZITHROMYCIN 250 MG: 250 TABLET, FILM COATED ORAL at 11:12

## 2017-10-22 NOTE — PROGRESS NOTES
Problem: Falls - Risk of  Goal: *Absence of Falls  Document Peter Fall Risk and appropriate interventions in the flowsheet.    Outcome: Progressing Towards Goal  Fall Risk Interventions:            Medication Interventions: Patient to call before getting OOB

## 2017-10-22 NOTE — PROGRESS NOTES
Bedside shift change report given to Diogenes (oncoming nurse) by me (offgoing nurse). Report given with SBAR, MAR and Recent Results.

## 2017-10-22 NOTE — ROUTINE PROCESS
Bedside and Verbal shift change report given to [address] [place] [address] (oncoming nurse) by Irais Smith RN (offgoing nurse). Report included the following information SBAR, Kardex, MAR and Recent Results. 0- Assumed care. Pt is sitting up in bed, eating BKF. VS with in normal limits. No C/O any pain or discomfort. No contractions reported or palpated. 8764- Monitors applied for NST. Hard to FHR trace continuously, holding monitor in place. 1035- NST completed. U/C x2. Mild to palpation, pt is not feeling them. 1213- Pt is taken down in W/C outside per order by her boyfriend. 1540- IV sited since 10/19/17, removed site without redness or edema, some tape irritation. Pt requested to not have another Saline lock put in unless needed. Dr Elizabeth Ma notified and MD ok for not having it right now. Sample malka and sent to blood bank. 1910- SBAR report to Miguelangel Calabrese RN given.

## 2017-10-22 NOTE — PROGRESS NOTES
Ante Partum Progress Note    Rafa Terry  30w1d    Assessment: 30w1d   Twins  Short cervix (3.7mm) by ultrasound with closed cervix but soft and thin on last sve. Threatened  labor s/p bmz and neuroprotective mag. Plan:  Continue hospitalization with hospitalized bedrest.  Recheck cervical length on Tuesday with MFM input. Discussed possible d/c home with limited activity at home vs continued hospitalization until more advanced gestation. She understands the plan may changed based on new information. Orders/Charges: Low and Non Stress Test  X 2    Patient states she has no new complaints    Vitals:  Visit Vitals    /76 (BP 1 Location: Left arm, BP Patient Position: At rest;Sitting)    Pulse 94    Temp 98.4 °F (36.9 °C)    Resp 18    Ht 5' 2\" (1.575 m)    Wt 76.2 kg (168 lb)    SpO2 98%    Breastfeeding Unknown    BMI 30.73 kg/m2     Temp (24hrs), Av.3 °F (36.8 °C), Min:98.3 °F (36.8 °C), Max:98.4 °F (36.9 °C)      Last 24hr Input/Output:  No intake or output data in the 24 hours ending 10/22/17 1122     Non stress test:  Reactive    Patient Vitals for the past 4 hrs: Mode Fetal Heart Rate Fetal Activity Variability Decelerations Accelerations RN Reviewed Strip?   10/22/17 1035 External;US Readjusted 140 Present 6-25 BPM None Yes Yes    Patient Vitals for the past 4 hrs: Mode Fetal Heart Rate Fetal Activity Variability Decelerations Accelerations RN Reviewed Strip?   10/22/17 1035 External;US Readjusted 140 Present 6-25 BPM None Yes Yes        Uterine Activity: Mild and Irregular     Exam:  Patient without distress.      Abdomen, fundus soft non-tender     Extremities, no redness or tenderness               Additional Exam: Deferred    Labs:     Lab Results   Component Value Date/Time    WBC 19.9 10/18/2017 11:14 AM    HGB 10.0 10/18/2017 11:14 AM    HCT 31.0 10/18/2017 11:14 AM    PLATELET 537  11:14 AM       No results found for this or any previous visit (from the past 24 hour(s)).

## 2017-10-23 PROCEDURE — 74011250637 HC RX REV CODE- 250/637: Performed by: OBSTETRICS & GYNECOLOGY

## 2017-10-23 PROCEDURE — 59025 FETAL NON-STRESS TEST: CPT

## 2017-10-23 PROCEDURE — 65410000002 HC RM PRIVATE OB

## 2017-10-23 RX ADMIN — FERROUS SULFATE TAB 325 MG (65 MG ELEMENTAL FE) 325 MG: 325 (65 FE) TAB at 09:14

## 2017-10-23 RX ADMIN — Medication 1 TABLET: at 09:14

## 2017-10-23 RX ADMIN — AZITHROMYCIN 250 MG: 250 TABLET, FILM COATED ORAL at 09:14

## 2017-10-23 NOTE — PROGRESS NOTES
0715: Received verbal report from Dania Lopez, RN, pt sleeping, will assess when pt wakes up    0930: Pt states she feels hot and flushed, sat pt upright in bed, provided with cool wash rags on forehead and neck, provided with gingeale and ice water. Pt on EFM     0935: pt states she is feeling better, pulse was elevated, is no longer elevated. Will continue to monitor    1430: Pt taken outside via wheelchair by family member for ground privileges.

## 2017-10-23 NOTE — PROGRESS NOTES
Ante Partum Progress Note    Kayley Prieto  30w2d    Assessment: 30w2d   Cervical incompetence    Plan:  Continue hospitalization with hospitalized bedrest, Maternal fetal medicine consultation tomorrow and Ultrasound evaluation tomorrow    Orders/Charges: Medium and Non Stress Test    Patient states she has no new complaints    Vitals:  Visit Vitals    /71    Pulse (!) 105    Temp 97.8 °F (36.6 °C)    Resp 16    Ht 5' 2\" (1.575 m)    Wt 76.2 kg (168 lb)    SpO2 98%    Breastfeeding Unknown    BMI 30.73 kg/m2     Temp (24hrs), Av °F (36.7 °C), Min:97.8 °F (36.6 °C), Max:98.2 °F (36.8 °C)      Last 24hr Input/Output:  No intake or output data in the 24 hours ending 10/23/17 0844     Non stress test:  Reactive  130/145, mod ricky, accels, no decels, no ctx  No data found. No data found. Uterine Activity: None     Exam:  Patient without distress. Abdomen, fundus soft non-tender     Extremities, no redness or tenderness               Additional Exam: Deferred    Labs:     Lab Results   Component Value Date/Time    WBC 19.9 10/18/2017 11:14 AM    HGB 10.0 10/18/2017 11:14 AM    HCT 31.0 10/18/2017 11:14 AM    PLATELET 769  11:14 AM       No results found for this or any previous visit (from the past 24 hour(s)).

## 2017-10-24 VITALS
HEART RATE: 102 BPM | SYSTOLIC BLOOD PRESSURE: 125 MMHG | BODY MASS INDEX: 30.91 KG/M2 | OXYGEN SATURATION: 97 % | DIASTOLIC BLOOD PRESSURE: 66 MMHG | WEIGHT: 168 LBS | HEIGHT: 62 IN | TEMPERATURE: 98.2 F | RESPIRATION RATE: 18 BRPM

## 2017-10-24 PROCEDURE — 74011250637 HC RX REV CODE- 250/637: Performed by: OBSTETRICS & GYNECOLOGY

## 2017-10-24 PROCEDURE — 59025 FETAL NON-STRESS TEST: CPT

## 2017-10-24 RX ADMIN — AZITHROMYCIN 250 MG: 250 TABLET, FILM COATED ORAL at 09:43

## 2017-10-24 RX ADMIN — Medication 1 TABLET: at 09:43

## 2017-10-24 RX ADMIN — FERROUS SULFATE TAB 325 MG (65 MG ELEMENTAL FE) 325 MG: 325 (65 FE) TAB at 09:42

## 2017-10-24 NOTE — DISCHARGE SUMMARY
Antepartum  Discharge Summary     Patient ID:  Alline Claude  [id number]  79 y.o.  1995    Admit date: 10/17/2017    Discharge date: 10/24/2017    Admission Diagnoses:    Patient Active Problem List   Diagnosis Code    Pregnancy Z34.90    Threatened  labor O47.00       Discharge Diagnoses: No discharge information exists for this patient. Patient Active Problem List   Diagnosis Code    Pregnancy Z34.90    Threatened  labor O47.00       Procedures for this admission:   steriods 10/17, 10/18. Neuroprotective magnesium sulfate 10/19     Hospital Course: patient was admitted from the office with shortened cervical length and funneling noted. She was given a course of  steriods. On HD2 she noted contractions and was treated with IV fluid bolus and magnesium Sulfate. Contractions resolved. Cervix remained short with funneling until repeat ultrasound done day of discharge which documented stability 17mm length and no further funneling noted. She was given instructions and close followup in the office  Ureaplasma vaginal swab + in office, treated with zpack. Completed course prior to discharge home   Closed cervix on exam        Disposition: Home or self care    Discharged Condition: stable    Patient plans to return for changes in her condition or the condition of the baby or for delivery of the baby. Patient Instructions:   Current Discharge Medication List      CONTINUE these medications which have NOT CHANGED    Details   PNV38-Iron Cbn&Gluc-FA-DSS-DHA 35-1- mg cmpk Take  by mouth.            Activity: Bedrest  Diet: Regular Diet    Follow-up with   Follow-up Appointments   Procedures    FOLLOW UP VISIT Appointment in: 3 - 5 Days Friday as scheduled in the office for [address] and follow-up visit     Friday as scheduled in the office for [address] and follow-up visit     Standing Status:   Standing     Number of Occurrences:   1     Order Specific Question:   Appointment in Answer:   3 - 5 Days        Signed:  Sandro Watts MD  10/24/2017  2:29 PM

## 2017-10-24 NOTE — ROUTINE PROCESS
12- SBAR report obtained from Nadia Franklin RN. Pt is sleeping. 65- Pt is eating BKF her boyfriend with her. No contractions reported. 0930- Pt is sleeping. 1030- Reactive NST. 200- Dr Rena Crump in to see pt. Waiting for U/S to be performed. 1450- Ultrasound normal. Pt is being discharged to home on bedrest.  Discharge instructions on tip sheet reviewed and signed by pt. Pt verbalized understanding. 0- Pt is taken down in W/C to the car. Went home with boyfriend.

## 2017-10-24 NOTE — PROGRESS NOTES
[facility].      10/24/2017      RE: Guero Isaac      To Whom it May Concern: This is to certify that Guero Isaac was treated at our facility from 10/17/17 to 10/24/17. She is to remain out of work until after delivery ([facility] 19/70/97)  due to complications of pregnancy  She has follow-up scheduled in my office on Friday 10/27/2017      Please feel free to contact my office [facility] [phone]) if you have any questions or concerns. Thank you for your assistance in this matter. I appreciate your consideration.       Sincerely,          Beltran Daigle MD

## 2017-10-24 NOTE — DISCHARGE INSTRUCTIONS
Labor: Care Instructions  Your Care Instructions   labor is the start of labor between 21 and 36 weeks of pregnancy. A full-term pregnancy lasts 37 to 42 weeks. In labor, the uterus contracts to open the cervix. This is the first stage of childbirth.  labor can be caused by a problem with the baby, the mother, or both. Often the cause is not known. In some cases, doctors use medicines to try to delay labor until 29 or more weeks of pregnancy. By this time, a baby has grown enough so that problems are not likely. In some cases--such as with a serious infection--it is healthier for the baby to be born early. Your treatment will depend on how far along you are in your pregnancy and on your health and your baby's health. Follow-up care is a key part of your treatment and safety. Be sure to make and go to all appointments, and call your doctor if you are having problems. It's also a good idea to know your test results and keep a list of the medicines you take. How can you care for yourself at home? · If your doctor prescribed medicines, take them exactly as directed. Call your doctor if you think you are having a problem with your medicine. · Rest until your doctor advises you about activity. He or she will tell you if you should stay in bed most of the time. You may need to arrange for  if you have young children. · Do not have sexual intercourse unless your doctor says it is safe. · Use pads, not tampons, if you have vaginal bleeding. · Make sure to drink plenty of fluids. Dehydration can lead to contractions. If you have kidney, heart, or liver disease and have to limit fluids, talk with your doctor before you increase the amount of fluids you drink. · Do not smoke or allow others to smoke around you. If you need help quitting, talk to your doctor about stop-smoking programs and medicines. These can increase your chances of quitting for good.   When should you call for help?  Call 911 anytime you think you may need emergency care. For example, call if:  · You passed out (lost consciousness). · You have severe vaginal bleeding. · You have severe pain in your belly or pelvis. · You have had fluid gushing or leaking from your vagina and you know or think the umbilical cord is bulging into your vagina. If this happens, immediately get down on your knees so your rear end (buttocks) is higher than your head. This will decrease the pressure on the cord until help arrives. Call your doctor now or seek immediate medical care if:  · You have signs of preeclampsia, such as:  ¨ Sudden swelling of your face, hands, or feet. ¨ New vision problems (such as dimness or blurring). ¨ A severe headache. · You have any vaginal bleeding. · You have belly pain or cramping. · You have a fever. · You have had regular contractions (with or without pain) for an hour. This means that you have 6 or more within 1 hour after you change your position and drink fluids. · You have a sudden release of fluid from the vagina. · You have low back pain or pelvic pressure that does not go away. · You notice that your baby has stopped moving or is moving much less than normal.  Watch closely for changes in your health, and be sure to contact your doctor if you have any problems. Where can you learn more? Go to http://[place]-nathalia.info/. Enter Q400 in the search box to learn more about \" Labor: Care Instructions. \"  Current as of: 2017  Content Version: 11.3  © 1610-6770 [employer]. Care instructions adapted under license by [employer] (which disclaims liability or warranty for this information). If you have questions about a medical condition or this instruction, always ask your healthcare professional. [address] any warranty or liability for your use of this information.

## 2017-10-24 NOTE — PROGRESS NOTES
Ante Partum Progress Note    [facility]  30w3d    Assessment: 30w3d twin pregnancy  Cervical incompetence    S/p  steriods and neuroprotective magnesiumSulfate   Improved CL on US today 17mm, no measurable funnel   Priro ureaplasm +, treated with Zpak. Completed course   Threatened pre-term labor. No further contractions. Cervix closed  Vertex/Breech--reviewed plan for vaginal delivery only if vertex/vertex presentation given  and small size  Once at 34wks or if fetal size >1500g then could attempt breech extraction of second twin or cephalic version. Understands if labors at this time would recommend proceeding with PCS will discuss further at next growth scan and document plan further       Plan:  Reviewed ultrasound findings. Although short, stable CL at this time  Discussed with MFM  Discharge home with the following: Activity: bedrest Diet: as tolerated. Follow up in office: this week. US on Friday to assess CL and fetal growth. Medications: prenatal vitamins and medications prior to admission . Orders/Charges: Medium and Non Stress Test  X 2    Patient states she does not have abdominal pain  , contractions, vaginal bleeding  and vaginal leaking of fluid    Active FM x 2    Overall feeling well  Discussed plan for d/c home on bedrest with follow-up in office on Friday     Vitals:  Visit Vitals    /66 (BP 1 Location: Left arm, BP Patient Position: At rest)    Pulse (!) 102    Temp 98.2 °F (36.8 °C)    Resp 18    Ht 5' 2\" (1.575 m)    Wt 76.2 kg (168 lb)    SpO2 97%    Breastfeeding Unknown    BMI 30.73 kg/m2     Temp (24hrs), Av.2 °F (36.8 °C), Min:98.2 °F (36.8 °C), Max:98.3 °F (36.8 °C)      Last 24hr Input/Output:  No intake or output data in the 24 hours ending 10/24/17 1408     Non stress test:  Reactive  An NST was performed and was reactive. The baseline FHR Baby A was 145. Moderate baseline  variability was noted.  Accelerations of sufficient amplitude and duration were noted. There were no decelerations noted. An NST was performed and was reactive. The baseline FHR Baby B was 140. Moderate baseline  variability was noted. Accelerations of sufficient amplitude and duration were noted. There were no decelerations noted. Patient Vitals for the past 4 hrs: Mode Fetal Heart Rate Fetal Activity Variability Decelerations Accelerations RN Reviewed Strip?   10/24/17 1030 External 140 Present 6-25 BPM Variable Yes Yes    Patient Vitals for the past 4 hrs: Mode Fetal Heart Rate Fetal Activity Variability Decelerations Accelerations RN Reviewed Strip?   10/24/17 1030 External 140 Present 6-25 BPM Variable Yes Yes        Uterine Activity: None     Exam:  Patient without distress. Abdomen, fundus soft non-tender     Extremities, no redness or tenderness               Additional Exam: Deferred    Labs:     Lab Results   Component Value Date/Time    WBC 19.9 10/18/2017 11:14 AM    HGB 10.0 10/18/2017 11:14 AM    HCT 31.0 10/18/2017 11:14 AM    PLATELET 916 39/36/4082 11:14 AM       No results found for this or any previous visit (from the past 24 hour(s)).

## 2017-11-10 LAB — GRBS, EXTERNAL: NORMAL

## 2017-12-12 ENCOUNTER — HOSPITAL ENCOUNTER (INPATIENT)
Age: 22
LOS: 2 days | Discharge: HOME | DRG: 560 | End: 2017-12-14
Attending: OBSTETRICS & GYNECOLOGY | Admitting: OBSTETRICS & GYNECOLOGY
Payer: MEDICAID

## 2017-12-12 ENCOUNTER — ANESTHESIA EVENT (OUTPATIENT)
Dept: LABOR AND DELIVERY | Age: 22
DRG: 560 | End: 2017-12-12
Payer: MEDICAID

## 2017-12-12 ENCOUNTER — ANESTHESIA (OUTPATIENT)
Dept: LABOR AND DELIVERY | Age: 22
DRG: 560 | End: 2017-12-12
Payer: MEDICAID

## 2017-12-12 LAB
BASOPHILS # BLD: 0 K/UL (ref 0–0.1)
BASOPHILS NFR BLD: 0 % (ref 0–1)
EOSINOPHIL # BLD: 0 K/UL (ref 0–0.4)
EOSINOPHIL NFR BLD: 0 % (ref 0–7)
ERYTHROCYTE [DISTWIDTH] IN BLOOD BY AUTOMATED COUNT: 14.3 % (ref 11.5–14.5)
HCT VFR BLD AUTO: 31.8 % (ref 35–47)
HGB BLD-MCNC: 9.9 G/DL (ref 11.5–16)
LYMPHOCYTES # BLD: 1.6 K/UL (ref 0.8–3.5)
LYMPHOCYTES NFR BLD: 17 % (ref 12–49)
MCH RBC QN AUTO: 25.6 PG (ref 26–34)
MCHC RBC AUTO-ENTMCNC: 31.1 G/DL (ref 30–36.5)
MCV RBC AUTO: 82.4 FL (ref 80–99)
MONOCYTES # BLD: 0.6 K/UL (ref 0–1)
MONOCYTES NFR BLD: 6 % (ref 5–13)
NEUTS SEG # BLD: 7.3 K/UL (ref 1.8–8)
NEUTS SEG NFR BLD: 77 % (ref 32–75)
PLATELET # BLD AUTO: 198 K/UL (ref 150–400)
RBC # BLD AUTO: 3.86 M/UL (ref 3.8–5.2)
WBC # BLD AUTO: 9.5 K/UL (ref 3.6–11)

## 2017-12-12 PROCEDURE — 74011000250 HC RX REV CODE- 250

## 2017-12-12 PROCEDURE — 65410000002 HC RM PRIVATE OB

## 2017-12-12 PROCEDURE — 0HQ9XZZ REPAIR PERINEUM SKIN, EXTERNAL APPROACH: ICD-10-PCS | Performed by: OBSTETRICS & GYNECOLOGY

## 2017-12-12 PROCEDURE — 74011250636 HC RX REV CODE- 250/636: Performed by: OBSTETRICS & GYNECOLOGY

## 2017-12-12 PROCEDURE — 36415 COLL VENOUS BLD VENIPUNCTURE: CPT | Performed by: OBSTETRICS & GYNECOLOGY

## 2017-12-12 PROCEDURE — 77030007880 HC KT SPN EPDRL BBMI -B

## 2017-12-12 PROCEDURE — 10907ZC DRAINAGE OF AMNIOTIC FLUID, THERAPEUTIC FROM PRODUCTS OF CONCEPTION, VIA NATURAL OR ARTIFICIAL OPENING: ICD-10-PCS | Performed by: OBSTETRICS & GYNECOLOGY

## 2017-12-12 PROCEDURE — 75410000001 HC DELIVERY VAGINAL/MULTIPLE

## 2017-12-12 PROCEDURE — 75410000002 HC LABOR FEE PER 1 HR

## 2017-12-12 PROCEDURE — 85025 COMPLETE CBC W/AUTO DIFF WBC: CPT | Performed by: OBSTETRICS & GYNECOLOGY

## 2017-12-12 PROCEDURE — 76060000078 HC EPIDURAL ANESTHESIA

## 2017-12-12 PROCEDURE — 3E0R3NZ INTRODUCTION OF ANALGESICS, HYPNOTICS, SEDATIVES INTO SPINAL CANAL, PERCUTANEOUS APPROACH: ICD-10-PCS | Performed by: ANESTHESIOLOGY

## 2017-12-12 PROCEDURE — 76815 OB US LIMITED FETUS(S): CPT

## 2017-12-12 PROCEDURE — 77030031139 HC SUT VCRL2 J&J -A

## 2017-12-12 PROCEDURE — 74011250636 HC RX REV CODE- 250/636

## 2017-12-12 PROCEDURE — 75410000003 HC RECOV DEL/VAG/CSECN EA 0.5 HR

## 2017-12-12 PROCEDURE — 10H07YZ INSERTION OF OTHER DEVICE INTO PRODUCTS OF CONCEPTION, VIA NATURAL OR ARTIFICIAL OPENING: ICD-10-PCS | Performed by: OBSTETRICS & GYNECOLOGY

## 2017-12-12 PROCEDURE — 77030014125 HC TY EPDRL BBMI -B: Performed by: ANESTHESIOLOGY

## 2017-12-12 PROCEDURE — 4A1H7CZ MONITORING OF PRODUCTS OF CONCEPTION, CARDIAC RATE, VIA NATURAL OR ARTIFICIAL OPENING: ICD-10-PCS | Performed by: OBSTETRICS & GYNECOLOGY

## 2017-12-12 PROCEDURE — 77030034849

## 2017-12-12 PROCEDURE — 74011000258 HC RX REV CODE- 258: Performed by: OBSTETRICS & GYNECOLOGY

## 2017-12-12 RX ORDER — OXYTOCIN IN 5 % DEXTROSE 30/500 ML
1-25 PLASTIC BAG, INJECTION (ML) INTRAVENOUS
Status: DISCONTINUED | OUTPATIENT
Start: 2017-12-12 | End: 2017-12-13 | Stop reason: HOSPADM

## 2017-12-12 RX ORDER — BUPIVACAINE HYDROCHLORIDE AND EPINEPHRINE 2.5; 5 MG/ML; UG/ML
INJECTION, SOLUTION EPIDURAL; INFILTRATION; INTRACAUDAL; PERINEURAL AS NEEDED
Status: DISCONTINUED | OUTPATIENT
Start: 2017-12-12 | End: 2017-12-12 | Stop reason: HOSPADM

## 2017-12-12 RX ORDER — FENTANYL/BUPIVACAINE/NS/PF 2-1250MCG
PREFILLED PUMP RESERVOIR EPIDURAL
Status: COMPLETED
Start: 2017-12-12 | End: 2017-12-12

## 2017-12-12 RX ORDER — BUPIVACAINE HYDROCHLORIDE 2.5 MG/ML
INJECTION, SOLUTION EPIDURAL; INFILTRATION; INTRACAUDAL
Status: DISPENSED
Start: 2017-12-12 | End: 2017-12-13

## 2017-12-12 RX ORDER — SODIUM CHLORIDE, SODIUM LACTATE, POTASSIUM CHLORIDE, CALCIUM CHLORIDE 600; 310; 30; 20 MG/100ML; MG/100ML; MG/100ML; MG/100ML
125 INJECTION, SOLUTION INTRAVENOUS CONTINUOUS
Status: DISCONTINUED | OUTPATIENT
Start: 2017-12-12 | End: 2017-12-14 | Stop reason: HOSPADM

## 2017-12-12 RX ORDER — CEFAZOLIN SODIUM IN 0.9 % NACL 2 G/100 ML
PLASTIC BAG, INJECTION (ML) INTRAVENOUS
Status: DISPENSED
Start: 2017-12-12 | End: 2017-12-13

## 2017-12-12 RX ORDER — FENTANYL/BUPIVACAINE/NS/PF 2-1250MCG
1-16 PREFILLED PUMP RESERVOIR EPIDURAL CONTINUOUS
Status: DISCONTINUED | OUTPATIENT
Start: 2017-12-12 | End: 2017-12-13 | Stop reason: HOSPADM

## 2017-12-12 RX ORDER — EPHEDRINE SULFATE 50 MG/ML
10 INJECTION, SOLUTION INTRAVENOUS
Status: DISCONTINUED | OUTPATIENT
Start: 2017-12-12 | End: 2017-12-13 | Stop reason: HOSPADM

## 2017-12-12 RX ORDER — PENICILLIN G POTASSIUM 5000000 [IU]/1
INJECTION, POWDER, FOR SOLUTION INTRAMUSCULAR; INTRAVENOUS
Status: DISPENSED
Start: 2017-12-12 | End: 2017-12-13

## 2017-12-12 RX ADMIN — SODIUM CHLORIDE 5 MILLION UNITS: 900 INJECTION, SOLUTION INTRAVENOUS at 12:39

## 2017-12-12 RX ADMIN — BUPIVACAINE HYDROCHLORIDE AND EPINEPHRINE 6 ML: 2.5; 5 INJECTION, SOLUTION EPIDURAL; INFILTRATION; INTRACAUDAL; PERINEURAL at 18:30

## 2017-12-12 RX ADMIN — SODIUM CHLORIDE, SODIUM LACTATE, POTASSIUM CHLORIDE, AND CALCIUM CHLORIDE 125 ML/HR: 600; 310; 30; 20 INJECTION, SOLUTION INTRAVENOUS at 23:01

## 2017-12-12 RX ADMIN — BUPIVACAINE HYDROCHLORIDE AND EPINEPHRINE 3 ML: 2.5; 5 INJECTION, SOLUTION EPIDURAL; INFILTRATION; INTRACAUDAL; PERINEURAL at 20:16

## 2017-12-12 RX ADMIN — BUPIVACAINE HYDROCHLORIDE AND EPINEPHRINE 0.4 ML: 2.5; 5 INJECTION, SOLUTION EPIDURAL; INFILTRATION; INTRACAUDAL; PERINEURAL at 18:29

## 2017-12-12 RX ADMIN — PENICILLIN G POTASSIUM 2.5 MILLION UNITS: 20000000 POWDER, FOR SOLUTION INTRAVENOUS at 15:57

## 2017-12-12 RX ADMIN — FENTANYL 0.2 MG/100ML-BUPIV 0.125%-NACL 0.9% EPIDURAL INJ 12 ML/HR: 2/0.125 SOLUTION at 18:54

## 2017-12-12 RX ADMIN — Medication 12 ML/HR: at 18:54

## 2017-12-12 RX ADMIN — SODIUM CHLORIDE, SODIUM LACTATE, POTASSIUM CHLORIDE, AND CALCIUM CHLORIDE 125 ML/HR: 600; 310; 30; 20 INJECTION, SOLUTION INTRAVENOUS at 15:03

## 2017-12-12 RX ADMIN — PENICILLIN G POTASSIUM 2.5 MILLION UNITS: 20000000 POWDER, FOR SOLUTION INTRAVENOUS at 19:56

## 2017-12-12 RX ADMIN — SODIUM CHLORIDE, SODIUM LACTATE, POTASSIUM CHLORIDE, AND CALCIUM CHLORIDE 125 ML/HR: 600; 310; 30; 20 INJECTION, SOLUTION INTRAVENOUS at 18:31

## 2017-12-12 NOTE — IP AVS SNAPSHOT
[address] [facility] 
[phone] Patient: Yasmin Jameson MRN: [id number] :1995 My Medications TAKE these medications as instructed Instructions Each Dose to Equal  
 Morning Noon Evening Bedtime  
 ibuprofen 800 mg tablet Commonly known as:  MOTRIN Your last dose was: Your next dose is: Take 1 Tab by mouth every eight (8) hours. 800 mg  
    
   
   
   
  
 oxyCODONE-acetaminophen 5-325 mg per tablet Commonly known as:  PERCOCET Your last dose was: Your next dose is: Take 1 Tab by mouth every four (4) hours as needed. Max Daily Amount: 6 Tabs. 1 Tab PNV38-Iron Cbn&Gluc-FA-DSS-DHA 35-1- mg Cmpk Your last dose was: Your next dose is: Take  by mouth. Where to Get Your Medications Information on where to get these meds will be given to you by the nurse or doctor. ! Ask your nurse or doctor about these medications  
  ibuprofen 800 mg tablet  
 oxyCODONE-acetaminophen 5-325 mg per tablet

## 2017-12-12 NOTE — PROGRESS NOTES
Seen and examined, Cat 1 tracing x 2, infrequent ctsx  Cx 3-4 on Madison's earlier exam.  Bedside US with cephalic presentation x 2. Will augment vanessa pitocin.

## 2017-12-12 NOTE — IP AVS SNAPSHOT
[address] [facility] 
[phone] Patient: Asia Chu MRN: [id number] :1995 About your hospitalization You were admitted on:  2017 You last received care in the:  [facility] You were discharged on:  2017 Why you were hospitalized Your primary diagnosis was:  Not on File Your diagnoses also included:  Rupture Of Membranes With Clear Amniotic Fluid Things You Need To Do (next 8 weeks) Follow up with Khurram Kessler MD  
  
Phone:  [phone] Where:  [address] [facility], [address] [facility] [place] [id number] Follow up with Mena Zaldivar MD in 6 week(s)  
routine 6wk pp check Phone:  [phone] Where:  [address] [facility] Discharge Orders None A check mak indicates which time of day the medication should be taken. My Medications TAKE these medications as instructed Instructions Each Dose to Equal  
 Morning Noon Evening Bedtime  
 ibuprofen 800 mg tablet Commonly known as:  MOTRIN Your last dose was: Your next dose is: Take 1 Tab by mouth every eight (8) hours. 800 mg  
    
   
   
   
  
 oxyCODONE-acetaminophen 5-325 mg per tablet Commonly known as:  PERCOCET Your last dose was: Your next dose is: Take 1 Tab by mouth every four (4) hours as needed. Max Daily Amount: 6 Tabs. 1 Tab PNV38-Iron Cbn&Gluc-FA-DSS-DHA 35-1- mg Cmpk Your last dose was: Your next dose is: Take  by mouth. Where to Get Your Medications Information on where to get these meds will be given to you by the nurse or doctor. ! Ask your nurse or doctor about these medications  
  ibuprofen 800 mg tablet  
 oxyCODONE-acetaminophen 5-325 mg per tablet Discharge Instructions Vaginal Childbirth: Care Instructions Your Care Instructions Your body will slowly heal in the next few weeks. It is easy to get too tired and overwhelmed during the first weeks after your baby is born. Changes in your hormones can shift your mood without warning. You may find it hard to meet the extra demands on your energy and time. Take it easy on yourself. Follow-up care is a key part of your treatment and safety. Be sure to make and go to all appointments, and call your doctor if you are having problems. It's also a good idea to know your test results and keep a list of the medicines you take. How can you care for yourself at home? · Vaginal bleeding and cramps ¨ After delivery, you will have a bloody discharge from the vagina. This will turn pink within a week and then white or yellow after about 10 days. It may last for 2 to 4 weeks or longer, until the uterus has healed. Use pads instead of tampons until you stop bleeding. ¨ Do not worry if you pass some blood clots, as long as they are smaller than a golf ball. If you have a tear or stitches in your vaginal area, change the pad at least every 4 hours to prevent soreness and infection. ¨ You may have cramps for the first few days after childbirth. These are normal and occur as the uterus shrinks to normal size. Take an over-the-counter pain medicine, such as acetaminophen (Tylenol), ibuprofen (Advil, Motrin), or naproxen (Aleve), for cramps. Read and follow all instructions on the label. Do not take aspirin, because it can cause more bleeding. ¨ Do not take two or more pain medicines at the same time unless the doctor told you to. Many pain medicines have acetaminophen, which is Tylenol. Too much acetaminophen (Tylenol) can be harmful. · Stitches ¨ If you have stitches, they will dissolve on their own and do not need to be removed. Follow your doctor's instructions for cleaning the stitched area. ¨ Put ice or a cold pack on your painful area for 10 to 20 minutes at a time, several times a day, for the first few days. Put a thin cloth between the ice and your skin. ¨ Sit in a few inches of warm water (sitz bath) 3 times a day and after bowel movements. The warm water helps with pain and itching. If you do not have a tub, a warm shower might help. · Breast fullness ¨ Your breasts may overfill (engorge) in the first few days after delivery. To help milk flow and to relieve pain, warm your breasts in the shower or by using warm, moist towels before nursing. ¨ If you are not nursing, do not put warmth on your breasts or touch your breasts. Wear a tight bra or sports bra and use ice until the fullness goes away. This usually takes 2 to 3 days. ¨ Put ice or a cold pack on your breast after nursing to reduce swelling and pain. Put a thin cloth between the ice and your skin. · Activity ¨ Eat a balanced diet. Do not try to lose weight by cutting calories. Keep taking your prenatal vitamins, or take a multivitamin. ¨ Get as much rest as you can. Try to take naps when your baby sleeps during the day. ¨ Get some exercise every day. But do not do any heavy exercise until your doctor says it is okay. ¨ Wait until you are healed (about 4 to 6 weeks) before you have sexual intercourse. Your doctor will tell you when it is okay to have sex. ¨ Talk to your doctor about birth control. You can get pregnant even before your period returns. Also, you can get pregnant while you are breastfeeding. · Mental health ¨ It is normal to have some sadness, anxiety, sleeplessness, and mood swings after you go home. If you feel upset or hopeless for more than a few days or are having trouble doing the things you need to do, talk to your doctor. · Constipation and hemorrhoids ¨ Drink plenty of fluids, enough so that your urine is light yellow or clear like water.  If you have kidney, heart, or liver disease and have to limit fluids, talk with your doctor before you increase the amount of fluids you drink. ¨ Eat plenty of fiber each day. Have a bran muffin or bran cereal for breakfast, and try eating a piece of fruit for a mid-afternoon snack. ¨ For painful, itchy hemorrhoids, put ice or a cold pack on the area several times a day for 10 minutes at a time. Follow this by putting a warm compress on the area for another 10 to 20 minutes or by sitting in a shallow, warm bath. When should you call for help? Call 911 anytime you think you may need emergency care. For example, call if: 
? · You passed out (lost consciousness). ?Call your doctor now or seek immediate medical care if: 
? · You have severe vaginal bleeding. ? · You are dizzy or lightheaded, or you feel like you may faint. ? · You have a fever. ? · You have new or more pain in your belly or pelvis. ? Watch closely for changes in your health, and be sure to contact your doctor if: 
? · Your vaginal bleeding seems to be getting heavier. ? · You have new or worse vaginal discharge. ? · You feel sad, anxious, or hopeless for more than a few days. ? · You do not get better as expected. Where can you learn more? Go to http://virginia-nathalia.info/. Enter C360 in the search box to learn more about \"Vaginal Childbirth: Care Instructions. \" Current as of: March 16, 2017 Content Version: 11.4 © 2146-6784 [employer]. Care instructions adapted under license by [employer] (which disclaims liability or warranty for this information). If you have questions about a medical condition or this instruction, always ask your healthcare professional. [place] [address] any warranty or liability for your use of this information. [employer] Announcement We are excited to announce that we are making your provider's discharge notes available to you in [employer].   You will see these notes when they are completed and signed by the physician that discharged you from your recent hospital stay. If you have any questions or concerns about any information you see in [employer], please call the Health Information Department where you were seen or reach out to your Primary Care Provider for more information about your plan of care. Introducing [facility]! Harvey Jaimes introduces [employer] patient portal. Now you can access parts of your medical record, email your doctor's office, and request medication refills online. 1. In your internet browser, go to [email or web address] [email or web address] 2. Click on the First Time User? Click Here link in the Sign In box. You will see the New Member Sign Up page. 3. Enter your [employer] Access Code exactly as it appears below. You will not need to use this code after youve completed the sign-up process. If you do not sign up before the expiration date, you must request a new code. · [employer] Access Code: [email or web address] Expires: 1/22/2018  1:42 PM 
 
4. Enter the last four digits of your Social Security Number (xxxx) and Date of Birth (mm/dd/yyyy) as indicated and click Submit. You will be taken to the next sign-up page. 5. Create a [employer] ID. This will be your [employer] login ID and cannot be changed, so think of one that is secure and easy to remember. 6. Create a [employer] password. You can change your password at any time. 7. Enter your Password Reset Question and Answer. This can be used at a later time if you forget your password. 8. Enter your e-mail address. You will receive e-mail notification when new information is available in [address] 9. Click Sign Up. You can now view and download portions of your medical record. 10. Click the Download Summary menu link to download a portable copy of your medical information.  
 
If you have questions, please visit the Frequently Asked Questions section of the [employer]. Remember, MyChart is NOT to be used for urgent needs. For medical emergencies, dial 911. Now available from your iPhone and Android! Providers Seen During Your Hospitalization Provider Specialty Primary office phone Trinh Miranda MD Obstetrics & Gynecology [phone] [email or web address] Gynecology [phone] Your Primary Care Physician (PCP) Primary Care Physician Office Phone Office Fax Vanesa Diaz [phone] You are allergic to the following No active allergies Recent Documentation Height Weight Breastfeeding? BMI OB Status Smoking Status 1.575 m 82.6 kg Yes 33.29 kg/m2 Pregnant Never Smoker Emergency Contacts Name Discharge Info Relation Home Work Mobile [address] CAREGIVER [3] Other Relative [6] [phone] Patient Belongings The following personal items are in your possession at time of discharge: 
  Dental Appliances: None  Visual Aid: None      Home Medications: None   Jewelry: Body Piercing, With patient (removed and given to mother)  Clothing: Footwear, Pants, Shirt, At bedside    Other Valuables: Cell Phone, At bedside  Personal Items Sent to Safe: none Please provide this summary of care documentation to your next provider. Signatures-by signing, you are acknowledging that this After Visit Summary has been reviewed with you and you have received a copy. Patient Signature:  ____________________________________________________________ Date:  ____________________________________________________________  
  
Syd Galeas Provider Signature:  ____________________________________________________________ Date:  ____________________________________________________________

## 2017-12-12 NOTE — PROGRESS NOTES
1702: Dr. Keely Hargrove in room, SVE done. 12/12/17 1702   Cervical Exam   Dilation (cm) 5   Eff 100 %   Station 0       1724: Pt requesting epidural, IVF bolus started.

## 2017-12-12 NOTE — H&P
EDC:2017  EGA: 37 weeks, 3 days      Vital Signs   25Years Old Female  Weight: 182 pounds  BP:       146/90  Hospital of delivery: [facility]    Pap/HPV/Gardasil History   History of abnormal pap: no  Gardasil Injection History: Declined    Patient's Prenatal Care with Doctor of Record Lily Ruiz MD Notable For -    GBS positive RX in labor  Anemia on FE pregnant  Shortened cervix 17mm 27wks   lab screening  Abnormality in fetus know or suspected. twin B, cystic hygroma, bowel herniation-MaterniT21-screen neg  Twins dichorionic/diamniotic              Past Pregnancy History      :  1    Pregnancy # 1     Comments:  current-di/di twins-spontaneous        Allergies    This patient has no known allergies. Medications Removed from Medication List        [address] [place] [address] for Follow-up Visit     Estimated weeks of        gestation:  37 3/7     Weight:  182     Blood pressure: 146 / 90     Headache:  +     Nausea/vomiting: No     Edema:  feet     Vaginal bleeding: no     Vaginal discharge: ROM     Fetal activity:  yes x 2     FHR:   140/135     Fetal position:  vertex     Cx Dilation:  3-4     Cx Effacement: 100%     Cx Station:  -1     Preceptor:  phil     Comment:  SROM in waiting room approx 1030. Clear fluid     GBS +     Irregaulr contraction s    Desires vaginal delivery  Most recent scans vtx/vtx.      to L&D for labor  Dr Rubi Baez aware         Impression & Recommendations:    Problem # 1:  Twins dichorionic/diamniotic (ICD-651.03) (GJN79-S77.043)  to L&D   SROM,  labor   concordant growth   vtx/vtx on most recent US   desires vaginal delivery   Will check position of baby B on arrival to L&D. Has been counseled on risk of transverse or breech posiitoning necistitating version or breech extraction of babyB.   Also potential risk of CS for twin B in emergent situation  desires to proceed with labor   CBC, STBB,  PIH labs--eleavted bP in office today (isolate vidal odonnell secondary to labor) Dr Mark Enrique aware and case discussed     Problem # 2:  GBS positive RX in labor (IJJ-195.18) (COZ26-U89.82)  penicillin per protocol     Problem # 3:  Abnormality in fetus know or suspected. twin B, cystic hygroma, bowel herniation-MaterniT21-screen neg (MMY-452.56) (FAD20-R42.8xx0)  resolved on subsequent US    normal testing         Medications (at conclusion of this visit)    08/15/2017 PNV-SELECT 27-0.6-0.4 MG ORAL TABLET (PRENATAL VIT W/JG-[id number]-IT) one tablet orally every day          CC:  SROM. History of Present Illness:  presented to office for scheduled  appt. SROM in waiting room. Clear fluid   Active FM x 2   Irregualr contractions    No complaint of vaginal bleeding. GBS+   BabyA vtx  (vtx/vtx on scan last week)   Will check posiiton of BabyB on arrival to L&D   To L&D for labor/delivery    Dr Mark Enrique aware         Past Medical History:     Reviewed history from 06/15/2017 and no changes required:        Negative     Past Surgical History:     Reviewed history from 06/15/2017 and no changes required:        Negative     Family History Summary:      Reviewed history Last on 10/17/2017 and no changes required:12/12/2017      General Comments - FH:  Family history transferred to [address] [place] [address]     Social History:     Reviewed history from 06/15/2017 and no changes required:        Single      Previous Tobacco Use: Signed On - 11/21/2017  Smoked Tobacco Use:  Never smoker  Smokeless Tobacco Use:  Never  Passive smoke exposure:  no  Drug use:  no  Caffeine use:  <! drinks per day    Previous Alcohol Use: Signed On - 11/21/2017  Alcohol use:  no  Exercise:  no  Seatbelt use:  preg- %  Sun Exposure:  rarely    PAP Smear History:     Date of Last PAP Smear:  06/15/2017      Review of Systems        See HPI    Except as noted in the HPI, the review of systems is negative for General and .       Vital Signs    Blood Pressure: 146 / 90  Contractions:  yes  UC Frequency:  Irregular      Weight: 182 pounds      Physical Exam     General           General appearance:  no acute distress    Head           Inspection:   normal    Eyes           External:   EOM intact    ENT           Dental:   adequate dentition    Chest           Lungs:  clear to auscultation          Heart:  regular rate and rhythm    Extremeties           Extremeties:  0 edema    Neurological           Reflexes:  2+ and symmetric with no pathological reflexes    Psych           Orientation:  oriented to time, place, and person          Mood:  no appearance of anxiety, depression, or agitation    Lymph           Inguinal:  no inguinal adenopathy    Skin           Inspection:  no rashes, suspicious lesions, or ulcerations    Abdomen           Abdomen:  gravid          EFW:  6#/6#    Pelvic Exam           EGBUS:  grosslu ruptured fluid  Clear           Vagina:  clear amniotic fluid  pooling    Ferning, nitrazine           Uterus:  gravid          Cervix:  no lesions or discharge                  Dilation: : 3-4                  Effacement:  100%                  Station:  -1                  Presentation:  vertex                  Membranes:  ruptured                  Pelvis:  adequate                  Consistency:  soft                  Position: anterior    Allergies    This patient has no known allergies. Medications Removed from Medication List        Impression & Recommendations:    Problem # 1:  Twins dichorionic/diamniotic (ICD-651.03) (SHB87-S96.043)  to L&D   SROM,  labor   concordant growth   vtx/vtx on most recent US   desires vaginal delivery   Will check position of baby B on arrival to L&D. Has been counseled on risk of transverse or breech posiitoning necistitating version or breech extraction of babyB.   Also potential risk of CS for twin B in emergent situation  desires to proceed with labor   CBC, STBB,  PIH labs--eleavted bP in office today (isolate Dala American secondary to labor)    Dr Max Aldrich aware and case discussed Problem # 2:  GBS positive RX in labor (RCI-934.67) (TJT88-T35.82)  penicillin per protocol     Problem # 3:  Abnormality in fetus know or suspected. twin B, cystic hygroma, bowel herniation-MaterniT21-screen neg (ICD-655.83) (WDK96-Z34.8xx0)  resolved on subsequent US    normal testing         Medications (at conclusion of this visit)    08/15/2017 PNV-SELECT 27-0.6-0.4 MG ORAL TABLET (PRENATAL VIT W/OC-LUEJBPBDT-IK) one tablet orally every day          LABORATORY DATA   TEST DATE RESULT   Group B Strep culture 11/10/2017 Positive                                   (Group B Strep Culture Result Field)   Blood Type 06/15/2017 O                                             (Blood Type Result Field)   Rh 06/15/2017 Positive                                   (Rh Result Field)   Rhogam Inj Given     Tdap Vaccine Given 10/10/2017 Vacc. [address]   Antibody Screen 10/10/2017 Negative   Rubella  [facility] Reference Ranges On or After 3/10/14                  <0.90              Non-immune      0.90 - 0.99     Equivocal      >0.99              Immune    [facility] Reference Ranges  Before 3/10/14           <5                 Non-immune             5 - 9               Equivocal            >9                 Immune  [facility] Reference Ranges       < Or = 0.90       Negative             0.91-1.09          Equivocal            > Or = 1.10       Positive   06/15/2017     5.94     TPA (T Pallidum Antibodies) 10/10/2017 Negative   Serology (RPR)     HBsAg 06/15/2017 Negative   HIV 06/15/2017 Non Reactive   Hemoglobin 10/10/2017 9.7   Hematocrit 10/10/2017 29.3   Platelets 14/49/5641 252 X10E3/UL   TSH     Urine Culture 06/15/2017 Negative   GC DNA Probe 06/15/2017 Negative   Chlamydia DNA 06/15/2017 Negative   PAP 06/15/2017 Deferred   Flu Vaccine Given 09/12/2017 Vacc.  VWC   HGBA1C     HGB Electro 06/15/2017 AA   T4, Free     BG Fasting     GTT 1H 50G 10/10/2017 111   GTT 1H 100G     GTT 2H 100G     GTT 3H 100G     Glucose Plasma     CF Accept or Decline 06/21/2017 declined   CF Screen Result     Nuchal Trans 06/21/2017 2.3^2.3 mm&millimeters   AFP Only 08/15/2017 *Screen Negative*   Tetra     AFP Serum     CVS 06/21/2017 declined   AFP Amniotic     Amnio Karyo     FISH     GC Culture     Chlamydia Cult     Ureaplasma 10/04/2017 Positive   Mycoplasma 10/04/2017 Negative   WBC 06/15/2017 12.2 X10E3/UL   RBC 06/15/2017 3.95 X10E6/UL   MCV 06/15/2017 89   MCH 06/15/2017 29.4   MCHC RBC 06/15/2017 33.0     ULTRASOUND DATA   TEST DATE RESULT   Estimated Fetal Weight 11/28/2017 6886^1676 g&grams                                     Weight % 11/28/2017 35^35% %&percent                                                SANTO                      BPP 12/05/2017 8^8 [n/a]&Not applicable   Cervical Length (mm)       ]      Electronically signed by Mary Skaggs MD on 12/12/2017 at 11:00 AM    ________________________________________________________________________

## 2017-12-12 NOTE — ANESTHESIA PROCEDURE NOTES
Epidural Block    Performed by: Ever Alvarez  Authorized by: Ever Alvarez     Pre-Procedure  Indication: labor epidural    Preanesthetic Checklist: risks and benefits discussed, site marked and timeout performed      Epidural:   Patient position:  Seated  Prep region:  Lumbar  Prep: DuraPrep    Location:  L2-3    Needle and Epidural Catheter:   Needle Type:  Tuohy  Needle Gauge:  17 G  Injection Technique:  Loss of resistance using air  Attempts:  1  Catheter Size:  19 G  Events: no blood with aspiration, no cerebrospinal fluid with aspiration, no paresthesia and negative aspiration test    Test Dose:  Bupivacaine 0.25% w/ epi and negative    Assessment:   Catheter Secured:  Tegaderm and tape  Insertion:  Uncomplicated  Patient tolerance:  Patient tolerated the procedure well with no immediate complications  Hands washed and mask worn during procedure. Spinal portion:    A 25 g pencil point spinal needle was placed through the Touhy x1 attempt until CSF was obtained. 0.4 mL 0.25% bupivacaine with 1:200K epinephrine was deposited into the CSF. -paresthesia.

## 2017-12-12 NOTE — ANESTHESIA PREPROCEDURE EVALUATION
Anesthetic History   No history of anesthetic complications            Review of Systems / Medical History  Patient summary reviewed, nursing notes reviewed and pertinent labs reviewed    Pulmonary  Within defined limits                 Neuro/Psych   Within defined limits           Cardiovascular  Within defined limits                Exercise tolerance: >4 METS     GI/Hepatic/Renal  Within defined limits              Endo/Other        Obesity and anemia     Other Findings            Physical Exam    Airway  Mallampati: II    Neck ROM: normal range of motion   Mouth opening: Normal     Cardiovascular  Regular rate and rhythm,  S1 and S2 normal,  no murmur, click, rub, or gallop             Dental         Pulmonary  Breath sounds clear to auscultation               Abdominal  GI exam deferred       Other Findings            Anesthetic Plan    ASA: 2  Anesthesia type: epidural            Anesthetic plan and risks discussed with: Patient

## 2017-12-12 NOTE — IP AVS SNAPSHOT
Summary of Care Report The Summary of Care report has been created to help improve care coordination. Users with access to [employer] or [address] (Web-based application) may access additional patient information including the Discharge Summary. If you are not currently a [address] user and need more information, please call the number listed below in the [place] section and ask to be connected with Medical Records. Facility Information Name Address Phone [address] [address] [phone] Patient Information Patient Name Sex  Yoandy Ambrose ([id number]) Female 1995 Discharge Information Admitting Provider Service Area Unit Brandyn Johnson MD / [facility] 3 Mother Infant / [phone] Discharge Provider Discharge Date/Time Discharge Disposition Destination (none) 2017 (Pending) AHR (none) Patient Language Language ENGLISH [13] Hospital Problems as of 2017  Reviewed: 2017  6:12 PM by Scottie Pang MD  
  
  
  
 Class Noted - Resolved Last Modified POA Active Problems Rupture of membranes with clear amniotic fluid  2017 - Present 2017 by Brandyn Johnson MD Unknown Entered by Brandyn Johnson MD  
  
Non-Hospital Problems as of 2017  Reviewed: 2017  6:12 PM by Scottie Pang MD  
  
  
  
 Class Noted - Resolved Last Modified Active Problems Pregnancy  10/17/2017 - Present 10/17/2017 by Brandyn Johnson MD  
  Entered by Brandyn Johnson MD  
  Threatened  labor  10/19/2017 - Present 10/19/2017 by Rakan Stone MD  
  Entered by Rakan Stone MD  
  
You are allergic to the following No active allergies Current Discharge Medication List  
  
START taking these medications Dose & Instructions Dispensing Information Comments  
 ibuprofen 800 mg tablet Commonly known as:  MOTRIN Dose:  800 mg Take 1 Tab by mouth every eight (8) hours. Quantity:  60 Tab Refills:  1  
   
 oxyCODONE-acetaminophen 5-325 mg per tablet Commonly known as:  PERCOCET Dose:  1 Tab Take 1 Tab by mouth every four (4) hours as needed. Max Daily Amount: 6 Tabs. Quantity:  24 Tab Refills:  0 CONTINUE these medications which have NOT CHANGED Dose & Instructions Dispensing Information Comments PNV38-Iron Cbn&Gluc-FA-DSS-DHA 35-1- mg Cmpk Take  by mouth. Refills:  0 Current Immunizations Name Date DTAP Vaccine 6/29/2001, 3/31/2000, 4/8/1997, 2/10/1997 HIB Vaccine 3/31/2000, 6/9/1997, 4/8/1997, 2/10/1997 Hep A Vaccine 2 Dose Schedule (Ped/Adol) 10/14/2013 Hepatitis A Vaccine 10/2/2012 Hepatitis B Vaccine 3/31/2000, 9/4/1997, 4/8/1997, 2/10/1997 IPV 6/29/2001, 3/31/2000, 6/9/1997, 4/8/1997, 2/10/1997 Influenza Vaccine PF 10/14/2013 MMR Vaccine 6/29/2001, 4/5/2001, 3/31/2000 Meningococcal Vaccine 10/2/2012 TDAP Vaccine 5/6/2008 Varicella Virus Vaccine Live 10/2/2012, 8/1/2002, 4/5/2001 Surgery Information ID Date/Time Status Primary Surgeon All Procedures Location [id number] 12/12/2017 Complete   ZZANESTHESIA MRM - DO NOT SCHEDULE Follow-up Information Follow up With Details Comments Contact Info Khurram Kessler MD   [address] [facility] [address] [place] [address] 
[phone] Mena Zaldivar MD In 6 weeks routine 6wk pp check  [phone] [phone] Discharge Instructions Vaginal Childbirth: Care Instructions Your Care Instructions Your body will slowly heal in the next few weeks. It is easy to get too tired and overwhelmed during the first weeks after your baby is born. Changes in your hormones can shift your mood without warning. You may find it hard to meet the extra demands on your energy and time. Take it easy on yourself. Follow-up care is a key part of your treatment and safety. Be sure to make and go to all appointments, and call your doctor if you are having problems. It's also a good idea to know your test results and keep a list of the medicines you take. How can you care for yourself at home? · Vaginal bleeding and cramps ¨ After delivery, you will have a bloody discharge from the vagina. This will turn pink within a week and then white or yellow after about 10 days. It may last for 2 to 4 weeks or longer, until the uterus has healed. Use pads instead of tampons until you stop bleeding. ¨ Do not worry if you pass some blood clots, as long as they are smaller than a golf ball. If you have a tear or stitches in your vaginal area, change the pad at least every 4 hours to prevent soreness and infection. ¨ You may have cramps for the first few days after childbirth. These are normal and occur as the uterus shrinks to normal size. Take an over-the-counter pain medicine, such as acetaminophen (Tylenol), ibuprofen (Advil, Motrin), or naproxen (Aleve), for cramps. Read and follow all instructions on the label. Do not take aspirin, because it can cause more bleeding. ¨ Do not take two or more pain medicines at the same time unless the doctor told you to. Many pain medicines have acetaminophen, which is Tylenol. Too much acetaminophen (Tylenol) can be harmful. · Stitches ¨ If you have stitches, they will dissolve on their own and do not need to be removed. Follow your doctor's instructions for cleaning the stitched area. ¨ Put ice or a cold pack on your painful area for 10 to 20 minutes at a time, several times a day, for the first few days. Put a thin cloth between the ice and your skin.  
¨ Sit in a few inches of warm water (sitz bath) 3 times a day and after bowel movements. The warm water helps with pain and itching. If you do not have a tub, a warm shower might help. · Breast fullness ¨ Your breasts may overfill (engorge) in the first few days after delivery. To help milk flow and to relieve pain, warm your breasts in the shower or by using warm, moist towels before nursing. ¨ If you are not nursing, do not put warmth on your breasts or touch your breasts. Wear a tight bra or sports bra and use ice until the fullness goes away. This usually takes 2 to 3 days. ¨ Put ice or a cold pack on your breast after nursing to reduce swelling and pain. Put a thin cloth between the ice and your skin. · Activity ¨ Eat a balanced diet. Do not try to lose weight by cutting calories. Keep taking your prenatal vitamins, or take a multivitamin. ¨ Get as much rest as you can. Try to take naps when your baby sleeps during the day. ¨ Get some exercise every day. But do not do any heavy exercise until your doctor says it is okay. ¨ Wait until you are healed (about 4 to 6 weeks) before you have sexual intercourse. Your doctor will tell you when it is okay to have sex. ¨ Talk to your doctor about birth control. You can get pregnant even before your period returns. Also, you can get pregnant while you are breastfeeding. · Mental health ¨ It is normal to have some sadness, anxiety, sleeplessness, and mood swings after you go home. If you feel upset or hopeless for more than a few days or are having trouble doing the things you need to do, talk to your doctor. · Constipation and hemorrhoids ¨ Drink plenty of fluids, enough so that your urine is light yellow or clear like water. If you have kidney, heart, or liver disease and have to limit fluids, talk with your doctor before you increase the amount of fluids you drink. ¨ Eat plenty of fiber each day. Have a bran muffin or bran cereal for breakfast, and try eating a piece of fruit for a mid-afternoon snack. ¨ For painful, itchy hemorrhoids, put ice or a cold pack on the area several times a day for 10 minutes at a time. Follow this by putting a warm compress on the area for another 10 to 20 minutes or by sitting in a shallow, warm bath. When should you call for help? Call 911 anytime you think you may need emergency care. For example, call if: 
? · You passed out (lost consciousness). ?Call your doctor now or seek immediate medical care if: 
? · You have severe vaginal bleeding. ? · You are dizzy or lightheaded, or you feel like you may faint. ? · You have a fever. ? · You have new or more pain in your belly or pelvis. ? Watch closely for changes in your health, and be sure to contact your doctor if: 
? · Your vaginal bleeding seems to be getting heavier. ? · You have new or worse vaginal discharge. ? · You feel sad, anxious, or hopeless for more than a few days. ? · You do not get better as expected. Where can you learn more? Go to http://[place]-nathalia.info/. Enter I942 in the search box to learn more about \"Vaginal Childbirth: Care Instructions. \" Current as of: March 16, 2017 Content Version: 11.4 © 8016-1944 [employer]. Care instructions adapted under license by [employer] (which disclaims liability or warranty for this information). If you have questions about a medical condition or this instruction, always ask your healthcare professional. [place] [address] any warranty or liability for your use of this information. Chart Review Routing History Recipient Method Report Sent By Yuan Rahman MD  
Fax: [phone] Phone: [phone] Fax Pancho Kiser MD NOTES AUTO ROUTING REPORT Michelle Kimbrough MD [[id number]] 10/19/2017 11:14 AM 10/19/2017 Topher Rahman MD  
Fax: [phone] Phone: [phone]  Fax Pancho Kiser MD NOTES AUTO ROUTING REPORT Michelle Kimbrough MD [[id number]] 10/19/2017  6:00 PM 10/19/2017 Nabila Silva MD  
Fax: [phone] Phone: [phone] Fax Genet Rooney MD NOTES AUTO ROUTING REPORT Eleno Poon MD [[id number]] 10/24/2017  2:32 PM 10/24/2017 Nabila Silva MD  
Fax: [phone] Phone: [phone] [facility] NOTES AUTO ROUTING REPORT Fela Pak MD [[id number]] 12/12/2017  2:37 PM 12/12/2017 Nabila Silva MD  
Fax: [phone] Phone: [phone] Fax Genet Rooney MD NOTES AUTO ROUTING REPORT Eleno Poon MD [[id number]] 12/14/2017  2:25 PM 12/14/2017

## 2017-12-12 NOTE — PROGRESS NOTES
1310: Dr. Dawson Park aware that pt had arrived, that pt had been on monitor and both babies were reactive. Dr. Dawson Park said that pt could be off monitor and walk in hallway. 1315: pt taken of monitor, encouraged to walk in hallway.

## 2017-12-13 PROCEDURE — 74011250637 HC RX REV CODE- 250/637: Performed by: OBSTETRICS & GYNECOLOGY

## 2017-12-13 PROCEDURE — 74011250636 HC RX REV CODE- 250/636: Performed by: OBSTETRICS & GYNECOLOGY

## 2017-12-13 PROCEDURE — 65410000002 HC RM PRIVATE OB

## 2017-12-13 PROCEDURE — 77030021125

## 2017-12-13 RX ORDER — SIMETHICONE 80 MG
80 TABLET,CHEWABLE ORAL
Status: DISCONTINUED | OUTPATIENT
Start: 2017-12-13 | End: 2017-12-14 | Stop reason: HOSPADM

## 2017-12-13 RX ORDER — ONDANSETRON 4 MG/1
4 TABLET, ORALLY DISINTEGRATING ORAL
Status: DISCONTINUED | OUTPATIENT
Start: 2017-12-13 | End: 2017-12-14 | Stop reason: HOSPADM

## 2017-12-13 RX ORDER — ACETAMINOPHEN 325 MG/1
650 TABLET ORAL
Status: DISCONTINUED | OUTPATIENT
Start: 2017-12-13 | End: 2017-12-14 | Stop reason: HOSPADM

## 2017-12-13 RX ORDER — OXYTOCIN/RINGER'S LACTATE 20/1000 ML
125-500 PLASTIC BAG, INJECTION (ML) INTRAVENOUS ONCE
Status: COMPLETED | OUTPATIENT
Start: 2017-12-13 | End: 2017-12-13

## 2017-12-13 RX ORDER — OXYCODONE AND ACETAMINOPHEN 5; 325 MG/1; MG/1
1 TABLET ORAL
Status: DISCONTINUED | OUTPATIENT
Start: 2017-12-13 | End: 2017-12-14 | Stop reason: HOSPADM

## 2017-12-13 RX ORDER — DIPHENHYDRAMINE HCL 25 MG
25 CAPSULE ORAL
Status: DISCONTINUED | OUTPATIENT
Start: 2017-12-13 | End: 2017-12-14 | Stop reason: HOSPADM

## 2017-12-13 RX ORDER — HYDROCORTISONE ACETATE PRAMOXINE HCL 2.5; 1 G/100G; G/100G
CREAM TOPICAL AS NEEDED
Status: DISCONTINUED | OUTPATIENT
Start: 2017-12-13 | End: 2017-12-14 | Stop reason: HOSPADM

## 2017-12-13 RX ORDER — IBUPROFEN 400 MG/1
800 TABLET ORAL EVERY 8 HOURS
Status: DISCONTINUED | OUTPATIENT
Start: 2017-12-13 | End: 2017-12-14 | Stop reason: HOSPADM

## 2017-12-13 RX ORDER — BISACODYL 5 MG
5 TABLET, DELAYED RELEASE (ENTERIC COATED) ORAL DAILY PRN
Status: DISCONTINUED | OUTPATIENT
Start: 2017-12-13 | End: 2017-12-14 | Stop reason: HOSPADM

## 2017-12-13 RX ORDER — NALOXONE HYDROCHLORIDE 0.4 MG/ML
0.4 INJECTION, SOLUTION INTRAMUSCULAR; INTRAVENOUS; SUBCUTANEOUS AS NEEDED
Status: DISCONTINUED | OUTPATIENT
Start: 2017-12-13 | End: 2017-12-14 | Stop reason: HOSPADM

## 2017-12-13 RX ADMIN — OXYCODONE HYDROCHLORIDE AND ACETAMINOPHEN 1 TABLET: 5; 325 TABLET ORAL at 01:52

## 2017-12-13 RX ADMIN — OXYCODONE HYDROCHLORIDE AND ACETAMINOPHEN 2 TABLET: 5; 325 TABLET ORAL at 22:52

## 2017-12-13 RX ADMIN — Medication 2500 MILLI-UNITS/HR: at 00:51

## 2017-12-13 RX ADMIN — OXYCODONE HYDROCHLORIDE AND ACETAMINOPHEN 2 TABLET: 5; 325 TABLET ORAL at 14:28

## 2017-12-13 RX ADMIN — IBUPROFEN 800 MG: 400 TABLET ORAL at 00:21

## 2017-12-13 RX ADMIN — OXYCODONE HYDROCHLORIDE AND ACETAMINOPHEN 1 TABLET: 5; 325 TABLET ORAL at 06:12

## 2017-12-13 RX ADMIN — OXYCODONE HYDROCHLORIDE AND ACETAMINOPHEN 1 TABLET: 5; 325 TABLET ORAL at 07:10

## 2017-12-13 RX ADMIN — IBUPROFEN 800 MG: 400 TABLET ORAL at 07:51

## 2017-12-13 RX ADMIN — IBUPROFEN 800 MG: 400 TABLET ORAL at 14:57

## 2017-12-13 RX ADMIN — IBUPROFEN 800 MG: 400 TABLET ORAL at 22:52

## 2017-12-13 NOTE — L&D DELIVERY NOTE
Delivery Summary  Patient: Aruna Chen             Circumcision:   desires  Additional Delivery Comments - SROM 1321 today. Labor augmented with pitocin and progressed to complete dilation. Adequately treated for GBS with PCN G. Moved to OR for delivery. Fetal position confirmed vertex/vertex by TAUS. Pushed ~1hr to a . Baby A delivered in the VIPUL position and restituted to ROT. The shoulders delivered spontaneously without any evidence of shoulder dystocia. The rest of the ensuing body delivered and the baby was placed on the mother's abdomen where he had excellent tone and a vigorous cry. His cord was allowed to continue pulsating before being clamped and cut. He was moved to the warmer. TAUS was performed and baby B was noted to be in the vertex presentation. The patient began to push with membranes intact but made minimal progress. AROM of baby B's sac was performed. Clear fluid was noted. With ongoing maternal expulsive efforts, rapid fetal descent occurred. Baby B delivered in the direct OA position and restituted to TAMAR. A tight nuchal cord was noted. I was unable to reduce it so delivered the body through the cord. There was no evidence of shoulder dystocia. Baby B was placed on the mother's abdomen and was noted to have decreased tone. Therefore, his cord was immediately clamped and cut and he was moved to the warmer. Intact placentas with a 3 vessel cords delivered spontaneously. Thereafter, IV pitocin and bimanual uterine massage were utilized to prevent uterine atony. Uterine tone was excellent. The cervix, vagina and rectum were explored. There was a first degree perineal laceration repaired with 3-0 vicryl. There was also a right periurethral laceration. This was hemostatic and, therefore, not repaired. EBL at this point was 450 mL and uterine tone was excellent. Mother and babies were stable.        Information for the patient's :  Immanuel Aguayo 1 Josiane Martinez [[id number]]       Labor Events:    Labor: No   Rupture Date: 2017   Rupture Time: 10:55 AM   Rupture Type SROM   Amniotic Fluid Volume:  Moderate    Amniotic Fluid Description: Clear     Induction: None       Augmentation: Oxytocin   Labor Events:       Cervical Ripening:     None     Delivery Events:  Episiotomy: None   Laceration(s): First degree perineal;Right periurethral     Repaired: Yes    Number of Repair Packets: 1   Suture Type and Size: Vicryl 3-0     Estimated Blood Loss (ml):  ml       Delivery Date: 2017    Delivery Time: 11:15 PM  Delivery Type: Vaginal, Spontaneous Delivery  Sex:  Male     Gestational Age: 44w3d   Delivery Clinician:  Kevin [facility]  Living Status: Living   Delivery Location: OR L & D OR          APGARS  One minute Five minutes Ten minutes   Skin color: 0   1        Heart rate: 2   2        Grimace: 2   2        Muscle tone: 2   2        Breathin   2        Totals: 8   9            Presentation: Vertex    Position: Right Occiput Anterior  Resuscitation Method:        Meconium Stained:        Cord Vessels: 3 Vessels      Cord Events:    Cord Blood Sent?:  No    Blood Gases Sent?:  No    Placenta:  Date/Time:    Removal: Spontaneous      Appearance: Normal     [place] Measurements:  Birth Weight: 2.94 kg      Birth Length: 49.5 cm      Head Circumference: 32.5 cm      Chest Circumference: 30.5 cm     Abdominal Girth: 29 cm    Other Providers:   Paula AHMADI;FORREST LACY;TAMARA LOOMIS;;;AJ RAYA;;;;RITESH BARROW;VAHID URIOSTEGUI;ANNIE RODRIGUEZ;DANIELA OROURKE, Obstetrician;Primary Nurse;Primary  Nurse;Nicu Nurse;Neonatologist;Anesthesiologist;Crna;Nurse Practitioner;Midwife;Nursery Nurse;Nursery Nurse;Charge Nurse;Tech       Information for the patient's newbornGercarlos albertoina Fetch [[id number]]       Labor Events:    Labor: No   Rupture Date: 2017   Rupture Time: 10:55 AM   Rupture Type SROM   Amniotic Fluid Volume:      Amniotic Fluid Description: Clear     Induction:         Augmentation:     Labor Events:       Cervical Ripening:           Delivery Events:  Episiotomy: None   Laceration(s): First degree perineal;Right periurethral     Repaired: Yes    Number of Repair Packets: 1   Suture Type and Size:       Estimated Blood Loss (ml):  ml       Delivery Date: 2017    Delivery Time: 11:25 PM  Delivery Type: Vaginal, Spontaneous Delivery  Sex:  Male     Gestational Age: 44w3d   Delivery Clinician:     Living Status:     Delivery Location:              APGARS  One minute Five minutes Ten minutes   Skin color: 1   1        Heart rate: 2   2        Grimace: 2   2        Muscle tone: 1   2        Breathin   2        Totals: 7   9            Presentation: Vertex    Position:   Occiput Anterior  Resuscitation Method:        Meconium Stained:        Cord Vessels: 3 Vessels      Cord Events:    Cord Blood Sent?:  No    Blood Gases Sent?:  No    Placenta:  Date/Time:    Removal: Spontaneous      Appearance: Normal      Measurements:  Birth Weight: 2.41 kg      Birth Length: 47.6 cm      Head Circumference: 32 cm      Chest Circumference: 29 cm     Abdominal Girth: 27 cm    Other Providers:    , Obstetrician;Primary Nurse;Primary  Nurse;Nicu Nurse;Neonatologist;Anesthesiologist;Crna;Nurse Practitioner;Midwife;Nursery Nurse           Cord pH:  none    Episiotomy:      Tray Dangeloa 1 kelly [[id number]]   None     YAZMIN Arenas 2 kelly [[id number]]   None    Laceration(s):      YAZMIN Hubbard kelly [[id number]]   First degree perineal;Right periurethral     YAZMIN Arenas 2la [[id number]]   First degree perineal;Right periurethral      Estimated Blood Loss (ml): 450mL    Labor Events  Method:      Buren Maite 1 kelly [[id number]]   None     YAZMIN Arenas 2 kelly [[id number]]           Augmentation:      YAZMIN Hubbard 1 kelly [[id number]]   Oxytocin     YAZMIN Arenas 2 Delfiordeb Gula [[id number]]        Cervical Ripening:      YAZMIN Arenas 1 Delray Gula [[id number]]         YAZMIN Arenas 2 Jaquan Gula [[id number]] [address], BOY 1 kelly [[id number]]         Elvi Bansal 2 kelly [[id number]]            Eb Novas [[id number]]   None     Eb Novas [[id number]]             Hospital Problems  Date Reviewed: 2017          Codes Class Noted POA    Rupture of membranes with clear amniotic fluid ICD-10-CM: O42.019  ICD-9-CM: 658.10  2017 Unknown              Operative Vaginal Delivery - none    Group B Strep:   Lab Results   Component Value Date/Time    GrBStrep, External pos 11/10/2017     Information for the patient's :  Elvi Bansal 1 kelly [[id number]]   No results found for: ABORH, PCTABR, PCTDIG, BILI, ABORHEXT, ABORH  Information for the patient's newbornLydia Fernandes 2 kelly [[id number]]   No results found for: ABORH, PCTABR, PCTDIG, BILI, ABORHEXT, ABORH    No results found for: APH, APCO2, APO2, AHCO3, ABEC, ABDC, O2ST, EPHV, PCO2V, PO2V, HCO3V, EBEV, EBDV, SITE, RSCOM

## 2017-12-13 NOTE — LACTATION NOTE
This note was copied from a baby's chart. 1 hour LC session with family. Reviewed basics of breastfeeding/initial week of life. Mother is fatigued and weak, receptive to [facility] and fob assisting her to tandem feed babies. Proposed we try one at at time so she could use her hands on to assist and practice  latch for her learning proper alignment and latch guidelines. Mom taught how to manually hand express her colostrum. Emphasized the importance of providing infant with valuable colostrum as infant rests skin to skin at breast. Aware to avoid extended periods of non-feeding. Taught the rationale behind this low tech but highly effective evidence based practice. Teaching and log sheets initiated for each baby. Importance of knowing your baby is getting enough through observation of drinking/swallowing/daily diaper expectations with attention to stool pattern transitions to assure milk transfer. Baby A - Assessed infants normal arched palate. Nipple not making contact when latching. Infant has nursed fair (2- 10 minute sessions) as of 12 hours of life. Accepted instruction in using a contact nipple shield. Baby eager and nursed 10 minutes out of a 30 minute assisted feeding. One wet diaper. Twin B - SGA with stable blood sugars. Nursing well x 4, also observed with contact nipple shield use for nipple to palate contact and stimulation of suckle. 20 minute baby led session, followed by another 20 minute session a few minutes later. One wet diaper. Followed with pumping x 15 minutes. Mother falling asleep at the end of 15 minute pumping session. Fob assisting holding flange and with compression/expression of colostrum to lead feedings. Simple Wishes binder/hands free pumping support offered and shown. Declines at this time. Twins support group information, positioning and nutrition guidelines/500 alex per baby for breastfeeding reviewed. Nursing pillow provided for comfort and support. Allow respite for now. Feed and double pump every 2-3 hours preferrably with baby led cues.

## 2017-12-13 NOTE — PROGRESS NOTES
Cx examined; C/7/+1, similar to prior check two hours ago. IUPC placed; Cat 1 tracing x 2. Will reexamine for increasing pressure, titrate pitocin to adequacy.

## 2017-12-13 NOTE — PROGRESS NOTES
Post-Partum Day Number 1 Progress Note    Carmita Arenas     Assessment: Doing well, post partum day 1    Plan:  1. Continue routine postpartum and perineal care as well as maternal education. 2.  One baby too small for circ. Other one not eating well. Likely will just delay both until psot partum. Information for the patient's :  Shanthi Arvin [[id number]]   Vaginal, Spontaneous Delivery  Information for the patient's :  Shanthi Arvin [[id number]]   Vaginal, Spontaneous Delivery   Patient doing well without significant complaint. Voiding without difficulty, normal lochia. Vitals:  Visit Vitals    /74 (BP 1 Location: Left arm, BP Patient Position: At rest)    Pulse 87    Temp 98.5 °F (36.9 °C)    Resp 18    Ht 5' 2\" (1.575 m)    Wt 82.6 kg (182 lb)    SpO2 99%    Breastfeeding Yes    BMI 33.29 kg/m2     Temp (24hrs), Av.6 °F (37 °C), Min:98.4 °F (36.9 °C), Max:99.1 °F (37.3 °C)        Exam:   Patient without distress. Abdomen soft, fundus firm, nontender                Perineum with normal lochia noted. Lower extremities are negative for swelling, cords or tenderness. Labs:     Lab Results   Component Value Date/Time    WBC 9.5 2017 12:30 PM    WBC 19.9 10/18/2017 11:14 AM    HGB 9.9 2017 12:30 PM    HGB 10.0 10/18/2017 11:14 AM    HCT 31.8 2017 12:30 PM    HCT 31.0 10/18/2017 11:14 AM    PLATELET 233  12:30 PM    PLATELET 525  11:14 AM       No results found for this or any previous visit (from the past 24 hour(s)).

## 2017-12-13 NOTE — PROGRESS NOTES
Problem: Falls - Risk of  Goal: *Absence of Falls  Document Peter Fall Risk and appropriate interventions in the flowsheet.    Outcome: Resolved/Met Date Met: 12/13/17  Fall Risk Interventions:

## 2017-12-13 NOTE — PROGRESS NOTES
Bedside report from [facility]. Assuming care of pt. Pt is resting at this time, family at bedside. 2004:  Pt c/o increased pressure in low abdomen and in rectum. Dr Cali Client at bedside and SVE done, no cervical change. IUPC inserted. 2018:  Called Dr Candi Modi to bs due to occluded epidural.  Troubleshot tubing and pump without results. Dr Candi Modi flushed epidural, now seems to be running without occlusion alarm. 2137:  Having increased pressure with uc's and continuous discomfort with pressure. Dr Negra Khan called to check pt.    2146:  Preparations made to take pt to OR for delivery in approximately 10 minutes. Dr Cali Client called in by Dr Negra Khan. Dr Candi Modi aware and will be present in [address] [place] [address].      2331:  PT delivered both twins vaginally at [phone] and [phone] in [address] [place] [address]. Placenta del now ([id number]). Dr Negra Khan repairing suture. Late entry:  Bedside ultrasound in OR by Dr Cali Client and vertex vertex position confirmed at 2213. Ultrasound also done after delivery of twin 1, twin 2 is vertex. 0157:  TRANSFER - OUT REPORT:    Verbal report given to ROSALIE Ortiz RN(name) on Aubrey Ramon  being transferred to MIU(unit) for routine progression of care       Report consisted of patients Situation, Background, Assessment and   Recommendations(SBAR). Information from the following report(s) SBAR, Intake/Output, MAR and Recent Results was reviewed with the receiving nurse. Lines:   Peripheral IV 12/12/17 Right Hand (Active)   Site Assessment Clean, dry, & intact 12/12/2017  7:30 PM   Phlebitis Assessment 0 12/12/2017  7:30 PM   Infiltration Assessment 0 12/12/2017  7:30 PM   Dressing Status Clean, dry, & intact 12/12/2017  7:30 PM   Dressing Type Tape;Transparent 12/12/2017  7:30 PM   Hub Color/Line Status Pink; Infusing 12/12/2017  7:30 PM        Opportunity for questions and clarification was provided.

## 2017-12-13 NOTE — PROGRESS NOTES
Late entry- at 2208 pt to OR 11 via bed accompanied by SO. Pt is comfortable- feeling pressure. Late entry- at 2209 Dr Nino Holman, Dr Moose Bridges, Dr Soumya Bentley at bedside in [place].  Portable U/s by DR Nino Holman vtx/vtx

## 2017-12-13 NOTE — PROGRESS NOTES
Patient feeling increased pressure. Pitocin @ 14mL/hr. Cvx: ant lip/100/+1  [place]: Q1-2 min    Will move toward OR. Double set-up and US ready. Anticipate .

## 2017-12-14 VITALS
BODY MASS INDEX: 33.49 KG/M2 | HEIGHT: 62 IN | HEART RATE: 101 BPM | SYSTOLIC BLOOD PRESSURE: 150 MMHG | WEIGHT: 182 LBS | OXYGEN SATURATION: 99 % | RESPIRATION RATE: 18 BRPM | DIASTOLIC BLOOD PRESSURE: 92 MMHG | TEMPERATURE: 97.8 F

## 2017-12-14 LAB
ERYTHROCYTE [DISTWIDTH] IN BLOOD BY AUTOMATED COUNT: 14.4 % (ref 11.5–14.5)
HCT VFR BLD AUTO: 23 % (ref 35–47)
HGB BLD-MCNC: 7.1 G/DL (ref 11.5–16)
MCH RBC QN AUTO: 25.5 PG (ref 26–34)
MCHC RBC AUTO-ENTMCNC: 30.9 G/DL (ref 30–36.5)
MCV RBC AUTO: 82.7 FL (ref 80–99)
PLATELET # BLD AUTO: 168 K/UL (ref 150–400)
RBC # BLD AUTO: 2.78 M/UL (ref 3.8–5.2)
WBC # BLD AUTO: 8.5 K/UL (ref 3.6–11)

## 2017-12-14 PROCEDURE — 85027 COMPLETE CBC AUTOMATED: CPT | Performed by: OBSTETRICS & GYNECOLOGY

## 2017-12-14 PROCEDURE — 97161 PT EVAL LOW COMPLEX 20 MIN: CPT

## 2017-12-14 PROCEDURE — G8978 MOBILITY CURRENT STATUS: HCPCS

## 2017-12-14 PROCEDURE — 74011250637 HC RX REV CODE- 250/637: Performed by: OBSTETRICS & GYNECOLOGY

## 2017-12-14 PROCEDURE — 36415 COLL VENOUS BLD VENIPUNCTURE: CPT | Performed by: OBSTETRICS & GYNECOLOGY

## 2017-12-14 PROCEDURE — G8979 MOBILITY GOAL STATUS: HCPCS

## 2017-12-14 PROCEDURE — 97116 GAIT TRAINING THERAPY: CPT

## 2017-12-14 RX ORDER — OXYCODONE AND ACETAMINOPHEN 5; 325 MG/1; MG/1
1 TABLET ORAL
Qty: 24 TAB | Refills: 0 | Status: SHIPPED | OUTPATIENT
Start: 2017-12-14

## 2017-12-14 RX ORDER — IBUPROFEN 800 MG/1
800 TABLET ORAL EVERY 8 HOURS
Qty: 60 TAB | Refills: 1 | Status: SHIPPED | OUTPATIENT
Start: 2017-12-14

## 2017-12-14 RX ADMIN — IBUPROFEN 800 MG: 400 TABLET ORAL at 08:35

## 2017-12-14 RX ADMIN — IBUPROFEN 800 MG: 400 TABLET ORAL at 15:02

## 2017-12-14 NOTE — PROGRESS NOTES
Post-Partum Day Number 2 Progress Note    Carmita Arenas     Assessment: Doing well, post partum day 2  Breastfeeding, twins. Doing well with tandem nursing. Using shields   Outpatient circ due to size  And 1 twin with undescended testes   Patient complaint of pelvic pain, left leg limited movement Unsteady of feet. No numbness or tingling  No weakness  --Will consult PT today. Await evaluation prior to discharge home     Plan:   1. Discharge home today--will hold until PT evaluation   2. Follow up in office in 6 weeks with Emmanuel Moore MD   3. Post partum activity advised, diet as tolerated  4. Discharge Medications: ibuprofen, percocet and medications prior to admission    Information for the patient's :  Eb Raymond [[id number]]   Vaginal, Spontaneous Delivery  Information for the patient's :  Eb Raymond [[id number]]   Vaginal, Spontaneous Delivery   Patient doing well without significant complaint. Voiding without difficulty, normal lochia. Vitals:  Visit Vitals    /83 (BP 1 Location: Left arm, BP Patient Position: At rest)    Pulse 86    Temp 98 °F (36.7 °C)    Resp 16    Ht 5' 2\" (1.575 m)    Wt 82.6 kg (182 lb)    SpO2 99%    Breastfeeding Yes    BMI 33.29 kg/m2     Temp (24hrs), Av °F (36.7 °C), Min:97.5 °F (36.4 °C), Max:98.2 °F (36.8 °C)      Exam:         Patient without distress. Abdomen soft, fundus firm, nontender                 Lower extremities are negative for swelling, cords or tenderness.     Labs:     Lab Results   Component Value Date/Time    WBC 8.5 2017 06:06 AM    WBC 9.5 2017 12:30 PM    WBC 19.9 10/18/2017 11:14 AM    HGB 7.1 2017 06:06 AM    HGB 9.9 2017 12:30 PM    HGB 10.0 10/18/2017 11:14 AM    HCT 23.0 2017 06:06 AM    HCT 31.8 2017 12:30 PM    HCT 31.0 10/18/2017 11:14 AM    PLATELET 244  06:06 AM    PLATELET 804  12:30 PM    PLATELET 599  11:14 AM Recent Results (from the past 24 hour(s))   CBC W/O DIFF    Collection Time: 12/14/17  6:06 AM   Result Value Ref Range    WBC 8.5 3.6 - 11.0 K/uL    RBC 2.78 (L) 3.80 - 5.20 M/uL    HGB 7.1 (L) 11.5 - 16.0 g/dL    HCT 23.0 (L) 35.0 - 47.0 %    MCV 82.7 80.0 - 99.0 FL    MCH 25.5 (L) 26.0 - 34.0 PG    MCHC 30.9 30.0 - 36.5 g/dL    RDW 14.4 11.5 - 14.5 %    PLATELET 320 557 - 933 K/uL

## 2017-12-14 NOTE — PROGRESS NOTES
Bedside shift change report given to FARHAN Batres RN (oncoming nurse) by SURYA Carlton RN (offgoing nurse). Report included the following information SBAR.

## 2017-12-14 NOTE — LACTATION NOTE
This note was copied from a baby's chart.     Couplet Interdisciplinary Rounds     MATERNAL    Daily Goal:     Influenza screening completed: YES   Tdap screening completed: YES   Rhogam Given:N/A  MMR Given:N/A    VTE Prophylaxis: Not indicated, per Provider order    EPDS:            Patient Name: Matthew Allen Diagnosis:   Single liveborn, born in hospital, delivered by vaginal delivery   Date of Admission: 2017 LOS: 2  Gestational Age: Gestational Age: 37w3d       Daily Goal:     Birth Weight: 2.41 kg Current Weight: Weight: (!) 2.29 kg (5 lbs 0.8 oz)  % of Weight Change: -5%    Feeding:  [place] Metabolic Screen: YES    Hepatitis B:  YES    Discharge Bili:  YES  Car Seat Trial, if needed:  N/A      Patient/Family Teaching Needs:     Days before discharge: Ready for discharge    In Attendance:  Nursing and Physician

## 2017-12-14 NOTE — PROGRESS NOTES
Problem: Mobility Impaired (Adult and Pediatric)  Goal: *Acute Goals and Plan of Care (Insert Text)  Physical Therapy Goals  Initiated 12/14/2017  1. Patient will move from supine to sit and sit to supine , scoot up and down and roll side to side in bed with independence within 7 day(s). 2.  Patient will transfer from bed to chair and chair to bed with independence using the least restrictive device within 7 day(s). 3.  Patient will perform sit to stand with independence within 7 day(s). 4.  Patient will ambulate with independence for 150 feet with the least restrictive device within 7 day(s). 5.  Patient will ascend/descend 4 stairs with 1 handrail(s) with modified independence within 7 day(s). physical Therapy EVALUATION  Patient: Ed Parents (24 y.o. female)  Date: 12/14/2017  Primary Diagnosis: labor  Rupture of membranes with clear amniotic fluid        Precautions:  Fall    ASSESSMENT :  Based on the objective data described below, the patient presents with impaired functional mobility secondary to impaired balance, generalized weakness, increased L hip/pelvis pain, and impaired gait mechanics post-partum day 2. Pt received supine in bed with family present and agreeable to PT evaluation. Pt cleared by nursing for mobility. Patient reports improvements in mobility and gait as compared to yesterday. Bed mobility performed mod I and increased time. Transfers performed with SBA. She ambulated 80' with CGA without AD. Demonstrates mildly unsteady gait, however no LOB noted during mobility. Exhibits LORIE hip and back hyperextension in standing and during gait with widened NABEEL and increased trunk sway. Cued pt to shift trunk over hips/shift hips backwards during gait to lessen hyperextension, however with improvements, pt reported more pain. Pain 0/10 at rest and 6/10 during initial gait. Post-mobility, pt reported improvement in pain, rating 4-5/10.  Educated pt on AROM and gentle stretching through BLEs to improve pain and decreased stiffness. Pt was assisted into sitting on EOB following therapy session. Educated pt to purchase [facility] as needed if improvements aren't seen within the next couple of days. Also educated pt to have assistance on steps for safety. Recommend pt return home with 24/7 family support/assistance and OP PT to address L hip pain and gait impairments. PT will continue to follow to address impairments while pt is in the hospital.    Patient will benefit from skilled intervention to address the above impairments. Patients rehabilitation potential is considered to be Good  Factors which may influence rehabilitation potential include:   [x]         None noted  []         Mental ability/status  []         Medical condition  []         Home/family situation and support systems  []         Safety awareness  []         Pain tolerance/management  []         Other:      PLAN :  Recommendations and Planned Interventions:  [x]           Bed Mobility Training             []    Neuromuscular Re-Education  [x]           Transfer Training                   []    Orthotic/Prosthetic Training  [x]           Gait Training                         []    Modalities  [x]           Therapeutic Exercises           []    Edema Management/Control  [x]           Therapeutic Activities            [x]    Patient and Family Training/Education  []           Other (comment):    Frequency/Duration: Patient will be followed by physical therapy  5 times a week to address goals. Discharge Recommendations: Outpatient  Further Equipment Recommendations for Discharge: Educated on purchasing [facility] if needed once home     SUBJECTIVE:   Patient stated It's better than it was yesterday.     OBJECTIVE DATA SUMMARY:   HISTORY:    Past Medical History:   Diagnosis Date    Anemia    History reviewed. No pertinent surgical history. Prior Level of Function/Home Situation: Pt is independent for mobility at baseline. Lives with family.  Has great family support. Will be discharging to mother's house for first few days home. Personal factors and/or comorbidities impacting plan of care: 2 days post-partum     Home Situation  Home Environment: Apartment  One/Two Story Residence: Two story  Lift Chair Available: No  Living Alone: No  Support Systems: Spouse/Significant Other/Partner, Parent  Patient Expects to be Discharged to[de-identified] Apartment  Current DME Used/Available at Home: None    EXAMINATION/PRESENTATION/DECISION MAKING:   Critical Behavior:              Hearing: Auditory  Auditory Impairment: None  Range Of Motion:  AROM: Generally decreased, functional                       Strength:    Strength: Generally decreased, functional                    Tone & Sensation:   Tone: Normal              Sensation: Intact               Coordination:  Coordination: Within functional limits  Vision:      Functional Mobility:  Bed Mobility:  Rolling: Modified independent; Additional time  Supine to Sit: Modified independent; Additional time     Scooting: Modified independent; Additional time  Transfers:  Sit to Stand: Stand-by asssistance  Stand to Sit: Stand-by asssistance                       Balance:   Sitting: Intact  Standing: Impaired  Standing - Static: Good  Standing - Dynamic : Good  Ambulation/Gait Training:  Distance (ft): 85 Feet (ft)  Assistive Device: Gait belt  Ambulation - Level of Assistance: Contact guard assistance;Assist x1;Additional time     Gait Description (WDL): Exceptions to WDL  Gait Abnormalities: Decreased step clearance;Trunk sway increased; Antalgic        Base of Support: Widened;Center of gravity altered     Speed/Priscilla: Pace decreased (<100 feet/min); Slow  Step Length: Left shortened;Right shortened       Therapeutic Exercises:   Educated pt on performing AROM and mild stretching to improve stiffness and pain    Functional Measure:  Barthel Index:    Bathin  Bladder: 10  Bowels: 10  Groomin  Dressing: 10  Feeding: 10  Mobility: 5  Stairs: 0  Toilet Use: 5  Transfer (Bed to Chair and Back): 10  Total: 65       Barthel and G-code impairment scale:  Percentage of impairment CH  0% CI  1-19% CJ  20-39% CK  40-59% CL  60-79% CM  80-99% CN  100%   Barthel Score 0-100 100 99-80 79-60 59-40 20-39 1-19   0   Barthel Score 0-20 20 17-19 13-16 9-12 5-8 1-4 0      The Barthel ADL Index: Guidelines  1. The index should be used as a record of what a patient does, not as a record of what a patient could do. 2. The main aim is to establish degree of independence from any help, physical or verbal, however minor and for whatever reason. 3. The need for supervision renders the patient not independent. 4. A patient's performance should be established using the best available evidence. Asking the patient, friends/relatives and nurses are the usual sources, but direct observation and common sense are also important. However direct testing is not needed. 5. Usually the patient's performance over the preceding 24-48 hours is important, but occasionally longer periods will be relevant. 6. Middle categories imply that the patient supplies over 50 per cent of the effort. 7. Use of aids to be independent is allowed. Be Rivas., Barthel, D.W. ([id number]). Functional evaluation: the Barthel Index. [address] [facility] (14)2. Pattie Vieira pete Amanuel, TAYLORF, Madhavi Camacho., Aleida Rios., [facility], [address] [place] [address] (1999). Measuring the change indisability after inpatient rehabilitation; comparison of the responsiveness of the Barthel Index and Functional [place] Measure. Journal of Neurology, Neurosurgery, and Psychiatry, 66(4), 418-239. [place], [place], Maritza Mitchell,  W.J.M, & Marcella Peoples, M.A. (2004.) Assessment of post-stroke quality of life in cost-effectiveness studies: The usefulness of the Barthel Index and the EuroQoL-5D. Quality of Life Research, 13, 558-21       G codes:   In compliance with CMSs Claims Based Outcome Reporting, the following G-code set was chosen for this patient based on their primary functional limitation being treated: The outcome measure chosen to determine the severity of the functional limitation was the Barthel Index with a score of 65/100 which was correlated with the impairment scale. ? Mobility - Walking and Moving Around:     - CURRENT STATUS: CJ - 20%-39% impaired, limited or restricted    - GOAL STATUS: CI - 1%-19% impaired, limited or restricted    - D/C STATUS:  ---------------To be determined---------------      Physical Therapy Evaluation Charge Determination   History Examination Presentation Decision-Making   MEDIUM  Complexity : 1-2 comorbidities / personal factors will impact the outcome/ POC  HIGH Complexity : 4+ Standardized tests and measures addressing body structure, function, activity limitation and / or participation in recreation  LOW Complexity : Stable, uncomplicated  Other outcome measures barthel index  MEDIUM      Based on the above components, the patient evaluation is determined to be of the following complexity level: MEDIUM    Pain:  Pain Scale 1: Numeric (0 - 10)  Pain Intensity 1: 0  Pain Location 1: Pelvis  Pain Orientation 1: Left  Pain Description 1: Aching     Activity Tolerance:   Good/fair - pain 4-6/10 in L hip/pelvis, otherwise without complaints  Please refer to the flowsheet for vital signs taken during this treatment. After treatment:   []         Patient left in no apparent distress sitting up in chair  [x]         Patient left in no apparent distress in bed  [x]         Call bell left within reach  [x]         Nursing notified  [x]         Caregiver present  []         Bed alarm activated    COMMUNICATION/EDUCATION:   The patients plan of care was discussed with: Physical Therapist and Registered Nurse. [x]         Fall prevention education was provided and the patient/caregiver indicated understanding.   [x]         Patient/family have participated as able in goal setting and plan of care.  [x]         Patient/family agree to work toward stated goals and plan of care. []         Patient understands intent and goals of therapy, but is neutral about his/her participation. []         Patient is unable to participate in goal setting and plan of care.     Thank you for this referral.  Merced Evans, PT, DPT   Time Calculation: 24 mins

## 2017-12-14 NOTE — DISCHARGE INSTRUCTIONS
Vaginal Childbirth: Care Instructions  Your Care Instructions    Your body will slowly heal in the next few weeks. It is easy to get too tired and overwhelmed during the first weeks after your baby is born. Changes in your hormones can shift your mood without warning. You may find it hard to meet the extra demands on your energy and time. Take it easy on yourself. Follow-up care is a key part of your treatment and safety. Be sure to make and go to all appointments, and call your doctor if you are having problems. It's also a good idea to know your test results and keep a list of the medicines you take. How can you care for yourself at home? · Vaginal bleeding and cramps  ¨ After delivery, you will have a bloody discharge from the vagina. This will turn pink within a week and then white or yellow after about 10 days. It may last for 2 to 4 weeks or longer, until the uterus has healed. Use pads instead of tampons until you stop bleeding. ¨ Do not worry if you pass some blood clots, as long as they are smaller than a golf ball. If you have a tear or stitches in your vaginal area, change the pad at least every 4 hours to prevent soreness and infection. ¨ You may have cramps for the first few days after childbirth. These are normal and occur as the uterus shrinks to normal size. Take an over-the-counter pain medicine, such as acetaminophen (Tylenol), ibuprofen (Advil, Motrin), or naproxen (Aleve), for cramps. Read and follow all instructions on the label. Do not take aspirin, because it can cause more bleeding. ¨ Do not take two or more pain medicines at the same time unless the doctor told you to. Many pain medicines have acetaminophen, which is Tylenol. Too much acetaminophen (Tylenol) can be harmful. · Stitches  ¨ If you have stitches, they will dissolve on their own and do not need to be removed. Follow your doctor's instructions for cleaning the stitched area.   ¨ Put ice or a cold pack on your painful area for 10 to 20 minutes at a time, several times a day, for the first few days. Put a thin cloth between the ice and your skin. ¨ Sit in a few inches of warm water (sitz bath) 3 times a day and after bowel movements. The warm water helps with pain and itching. If you do not have a tub, a warm shower might help. · Breast fullness  ¨ Your breasts may overfill (engorge) in the first few days after delivery. To help milk flow and to relieve pain, warm your breasts in the shower or by using warm, moist towels before nursing. ¨ If you are not nursing, do not put warmth on your breasts or touch your breasts. Wear a tight bra or sports bra and use ice until the fullness goes away. This usually takes 2 to 3 days. ¨ Put ice or a cold pack on your breast after nursing to reduce swelling and pain. Put a thin cloth between the ice and your skin. · Activity  ¨ Eat a balanced diet. Do not try to lose weight by cutting calories. Keep taking your prenatal vitamins, or take a multivitamin. ¨ Get as much rest as you can. Try to take naps when your baby sleeps during the day. ¨ Get some exercise every day. But do not do any heavy exercise until your doctor says it is okay. ¨ Wait until you are healed (about 4 to 6 weeks) before you have sexual intercourse. Your doctor will tell you when it is okay to have sex. ¨ Talk to your doctor about birth control. You can get pregnant even before your period returns. Also, you can get pregnant while you are breastfeeding. · Mental health  ¨ It is normal to have some sadness, anxiety, sleeplessness, and mood swings after you go home. If you feel upset or hopeless for more than a few days or are having trouble doing the things you need to do, talk to your doctor. · Constipation and hemorrhoids  ¨ Drink plenty of fluids, enough so that your urine is light yellow or clear like water.  If you have kidney, heart, or liver disease and have to limit fluids, talk with your doctor before you increase the amount of fluids you drink. ¨ Eat plenty of fiber each day. Have a bran muffin or bran cereal for breakfast, and try eating a piece of fruit for a mid-afternoon snack. ¨ For painful, itchy hemorrhoids, put ice or a cold pack on the area several times a day for 10 minutes at a time. Follow this by putting a warm compress on the area for another 10 to 20 minutes or by sitting in a shallow, warm bath. When should you call for help? Call 911 anytime you think you may need emergency care. For example, call if:  ? · You passed out (lost consciousness). ?Call your doctor now or seek immediate medical care if:  ? · You have severe vaginal bleeding. ? · You are dizzy or lightheaded, or you feel like you may faint. ? · You have a fever. ? · You have new or more pain in your belly or pelvis. ? Watch closely for changes in your health, and be sure to contact your doctor if:  ? · Your vaginal bleeding seems to be getting heavier. ? · You have new or worse vaginal discharge. ? · You feel sad, anxious, or hopeless for more than a few days. ? · You do not get better as expected. Where can you learn more? Go to http://[place]-nathalia.info/. Enter H062 in the search box to learn more about \"Vaginal Childbirth: Care Instructions. \"  Current as of: March 16, 2017  Content Version: 11.4  © 1244-0510 [employer]. Care instructions adapted under license by [employer] (which disclaims liability or warranty for this information). If you have questions about a medical condition or this instruction, always ask your healthcare professional. [address] any warranty or liability for your use of this information.

## 2017-12-14 NOTE — DISCHARGE SUMMARY
Obstetrical Discharge Summary     Name: Aby Desouza MRN: [id number]  SSN: [id number]    YOB: 1995  Age: 25 y.o. Sex: female      Admit Date: 2017    Discharge Date: 2017     Admitting Physician: Chan Zabala MD     Attending Physician:  Chan Zabala MD     Admission Diagnoses: labor  Rupture of membranes with clear amniotic fluid    Discharge Diagnoses:   Information for the patient's :  Kurt Martinez [[id number]]   Delivery of a 2.94 kg male infant via Vaginal, Spontaneous Delivery on 2017 at 11:15 PM  by . Apgars were 8 and 9. Information for the patient's :  Kurt Martinez [[id number]]   Delivery of a 2.41 kg male infant via Vaginal, Spontaneous Delivery on 2017 at 11:25 PM  by . Apgars were 7 and 9. Additional Diagnoses:   Hospital Problems  Date Reviewed: 2017          Codes Class Noted POA    Rupture of membranes with clear amniotic fluid ICD-10-CM: O42.019  ICD-9-CM: 658.10  2017 Unknown             Lab Results   Component Value Date/Time    Rubella, External 5.94 IMM 06/15/2017    GrBStrep, External pos 11/10/2017       Hospital Course: Admitted with SROM, labor  Augmentation with pitocin. Uncomplicated , twins. Normal hospital course following the delivery. Some gait instability that improved on PPD2  Was seen and evaluated by PT. Will follow-up by phone with patient in 1 week. If no continued improvement will scheduled outpatient PT  For gait retraining   Twin boys, outpatient circumcision     Disposition at Discharge: Home or self care    Discharged Condition: Stable    Patient Instructions:   Current Discharge Medication List      START taking these medications    Details   ibuprofen (MOTRIN) 800 mg tablet Take 1 Tab by mouth every eight (8) hours. Qty: 60 Tab, Refills: 1      oxyCODONE-acetaminophen (PERCOCET) 5-325 mg per tablet Take 1 Tab by mouth every four (4) hours as needed. Max Daily Amount: 6 Tabs.   Qty: 24 Tab, Refills: 0         CONTINUE these medications which have NOT CHANGED    Details   PNV38-Iron Cbn&Gluc-FA-DSS-DHA 35-1- mg cmpk Take  by mouth. Reference my discharge instructions.     Follow-up Appointments   Procedures    FOLLOW UP VISIT Appointment in: 6 Weeks     Standing Status:   Standing     Number of Occurrences:   1     Order Specific Question:   Appointment in     Answer:   6 Weeks        Signed By:  Mena Zaldivar MD     December 14, 2017

## 2017-12-14 NOTE — LACTATION NOTE
This note was copied from a baby's chart. Infant  7 times with one additional attempt to feed and had 1 ml of pumped EBM. LATC scores are 8 and 9. Weight loss is -4.9% with 3 voids and 5 stools in past 24 hours. Encourage continued frequent feeding of twins with rest between feedings and pumpings.

## 2017-12-14 NOTE — PROGRESS NOTES
Verbal and written instructions given. All questions answered. Prescriptions given. Patient discharged to  per 's orders. Accompanied to car by this nurse and her mother.

## 2017-12-14 NOTE — PROGRESS NOTES
Bedside shift change report given to SURYA Fountain (oncoming nurse) by Sugar Laurent RN (offgoing nurse). Report included the following information SBAR.

## 2017-12-14 NOTE — LACTATION NOTE
This note was copied from a baby's chart. Infant  6 times with 3 additional sleepy feeds in 24 hours. LATCH scores are 6 and 8. Infant weight loss is -6.6% with 3 voids and 2 stools. Encourage continued frequent feeding of twins with rest between feedings.

## 2022-03-18 PROBLEM — O47.00 THREATENED PRETERM LABOR: Status: ACTIVE | Noted: 2017-10-19

## 2022-03-19 PROBLEM — Z34.90 PREGNANCY: Status: ACTIVE | Noted: 2017-10-17

## 2023-12-01 ENCOUNTER — HOSPITAL ENCOUNTER (EMERGENCY)
Facility: HOSPITAL | Age: 28
Discharge: HOME | End: 2023-12-01
Attending: STUDENT IN AN ORGANIZED HEALTH CARE EDUCATION/TRAINING PROGRAM

## 2023-12-01 VITALS
TEMPERATURE: 97.9 F | SYSTOLIC BLOOD PRESSURE: 133 MMHG | RESPIRATION RATE: 16 BRPM | OXYGEN SATURATION: 100 % | DIASTOLIC BLOOD PRESSURE: 69 MMHG | HEART RATE: 100 BPM | WEIGHT: 200.4 LBS

## 2023-12-01 DIAGNOSIS — H10.31 ACUTE CONJUNCTIVITIS OF RIGHT EYE, UNSPECIFIED ACUTE CONJUNCTIVITIS TYPE: Primary | ICD-10-CM

## 2023-12-01 PROCEDURE — 99283 EMERGENCY DEPT VISIT LOW MDM: CPT

## 2023-12-01 PROCEDURE — 6370000000 HC RX 637 (ALT 250 FOR IP): Performed by: STUDENT IN AN ORGANIZED HEALTH CARE EDUCATION/TRAINING PROGRAM

## 2023-12-01 RX ORDER — TETRACAINE HYDROCHLORIDE 5 MG/ML
1 SOLUTION OPHTHALMIC ONCE
Status: COMPLETED | OUTPATIENT
Start: 2023-12-01 | End: 2023-12-01

## 2023-12-01 RX ORDER — ERYTHROMYCIN 5 MG/G
OINTMENT OPHTHALMIC
Qty: 2 G | Refills: 0 | Status: SHIPPED | OUTPATIENT
Start: 2023-12-01 | End: 2023-12-11

## 2023-12-01 RX ORDER — ERYTHROMYCIN 5 MG/G
OINTMENT OPHTHALMIC
Qty: 2 G | Refills: 0 | Status: SHIPPED | OUTPATIENT
Start: 2023-12-01 | End: 2023-12-01 | Stop reason: SDUPTHER

## 2023-12-01 RX ORDER — ERYTHROMYCIN 5 MG/G
OINTMENT OPHTHALMIC
Status: COMPLETED | OUTPATIENT
Start: 2023-12-01 | End: 2023-12-01

## 2023-12-01 RX ADMIN — ERYTHROMYCIN: 5 OINTMENT OPHTHALMIC at 13:54

## 2023-12-01 RX ADMIN — TETRACAINE HYDROCHLORIDE 1 DROP: 5 SOLUTION OPHTHALMIC at 13:25

## 2023-12-01 RX ADMIN — FLUORESCEIN SODIUM 1 MG: 1 STRIP OPHTHALMIC at 13:26

## 2023-12-01 ASSESSMENT — VISUAL ACUITY
OD: 20/30
OS: 20/20
OU: 20/15

## 2023-12-01 ASSESSMENT — LIFESTYLE VARIABLES
HOW MANY STANDARD DRINKS CONTAINING ALCOHOL DO YOU HAVE ON A TYPICAL DAY: 1 OR 2
HOW OFTEN DO YOU HAVE A DRINK CONTAINING ALCOHOL: 4 OR MORE TIMES A WEEK

## 2023-12-01 NOTE — DISCHARGE INSTRUCTIONS
- Continue erythromycin ointment as discussed for bacterial conjunctivitis and use refrigerated artificial tears additionally as needed for pain relief  - Follow up with ophthalmology as soon as possible to be reevaluated  - Return for fevers, severe pain, loss of vision, worsening symptoms

## 2024-08-26 NOTE — PROGRESS NOTES
Assumed care of this 26 y/o  @ 37/3wks admitted for SROM (1030). GBS positive - adequately treated at this point. Pregnancy complicated by di-di twin gestation. TAUS on admission - vertex/vertex presentation. Twin B initially noted to have a cystic hygroma and bowel herniation (MT21 neg), resolved on subsequent US. Now on pitocin 10mL/hr. IUPC in place. Visit Vitals    /74    Pulse 74    Temp 98.5 °F (36.9 °C)    Resp 16    Ht 5' 2\" (1.575 m)    Wt 82.6 kg (182 lb)    SpO2 100%    Breastfeeding Yes    BMI 33.29 kg/m2     Titrate pitocin to -250. Plan to move to OR with double set-up when ready for delivery. Chief Complaint   Patient presents with    Diabetes       \"Have you been to the ER, urgent care clinic since your last visit?  Hospitalized since your last visit?\"    YES - When: approximately 1 months ago.  Where and Why: [facility]- fall.    “Have you seen or consulted any other health care providers outside of [facility] since your last visit?”    YES - When: approximately 1 months ago.  Where and Why: fall-dislocated shoulder .            Click Here for Release of Records Request     Results for orders placed or performed in visit on 24   AMB POC HEMOGLOBIN A1C   Result Value Ref Range    Hemoglobin A1C, POC 7.0 %      Vitals:    24 1045 24 1052   BP: (!) 150/95 (!) 157/90   Pulse: 78    Resp: 16    Temp: 97.8 °F (36.6 °C)    TempSrc: Oral    SpO2: 98%    Weight: 100.1 kg (220 lb 9.6 oz)       Health Maintenance Due   Topic Date Due    Lung Cancer Screening &/or Counseling  Never done    Diabetic foot exam  2018    Diabetic retinal exam  2020    AAA screen  Never done    Diabetic Alb to Cr ratio (uACR) test  10/05/2023        The patient, Sai Rios, identity was verified by name and .  Per verbal orders of Dr. Leblanc , injection of flu vaccine  was given in the Left deltoid . Patient tolerated it well. Patient instructed to report any adverse reaction to me immediately.

## 2024-10-16 ENCOUNTER — HOSPITAL ENCOUNTER (EMERGENCY)
Facility: HOSPITAL | Age: 29
Discharge: HOME | End: 2024-10-17
Attending: STUDENT IN AN ORGANIZED HEALTH CARE EDUCATION/TRAINING PROGRAM

## 2024-10-16 VITALS
OXYGEN SATURATION: 100 % | WEIGHT: 192 LBS | HEART RATE: 99 BPM | DIASTOLIC BLOOD PRESSURE: 83 MMHG | SYSTOLIC BLOOD PRESSURE: 131 MMHG | TEMPERATURE: 97.8 F | HEIGHT: 62 IN | BODY MASS INDEX: 35.33 KG/M2 | RESPIRATION RATE: 18 BRPM

## 2024-10-16 DIAGNOSIS — J18.9 ATYPICAL PNEUMONIA: Primary | ICD-10-CM

## 2024-10-16 DIAGNOSIS — F17.200 TOBACCO USE DISORDER: ICD-10-CM

## 2024-10-16 PROCEDURE — 99283 EMERGENCY DEPT VISIT LOW MDM: CPT

## 2024-10-16 RX ORDER — PREDNISONE 20 MG/1
40 TABLET ORAL ONCE
Status: COMPLETED | OUTPATIENT
Start: 2024-10-16 | End: 2024-10-17

## 2024-10-16 RX ORDER — ALBUTEROL SULFATE 90 UG/1
2 INHALANT RESPIRATORY (INHALATION) ONCE
Status: COMPLETED | OUTPATIENT
Start: 2024-10-16 | End: 2024-10-17

## 2024-10-16 ASSESSMENT — PAIN - FUNCTIONAL ASSESSMENT: PAIN_FUNCTIONAL_ASSESSMENT: 0-10

## 2024-10-16 ASSESSMENT — PAIN DESCRIPTION - PAIN TYPE: TYPE: ACUTE PAIN

## 2024-10-16 ASSESSMENT — PAIN DESCRIPTION - DESCRIPTORS: DESCRIPTORS: ACHING

## 2024-10-16 ASSESSMENT — PAIN DESCRIPTION - LOCATION: LOCATION: GENERALIZED

## 2024-10-16 ASSESSMENT — PAIN SCALES - GENERAL: PAINLEVEL_OUTOF10: 6

## 2024-10-17 ENCOUNTER — APPOINTMENT (OUTPATIENT)
Facility: HOSPITAL | Age: 29
End: 2024-10-17

## 2024-10-17 PROCEDURE — 71046 X-RAY EXAM CHEST 2 VIEWS: CPT

## 2024-10-17 PROCEDURE — 6370000000 HC RX 637 (ALT 250 FOR IP): Performed by: STUDENT IN AN ORGANIZED HEALTH CARE EDUCATION/TRAINING PROGRAM

## 2024-10-17 RX ORDER — AZITHROMYCIN 250 MG/1
250 TABLET, FILM COATED ORAL DAILY
Qty: 5 TABLET | Refills: 0 | Status: SHIPPED | OUTPATIENT
Start: 2024-10-17 | End: 2024-10-22

## 2024-10-17 RX ORDER — AZITHROMYCIN 500 MG/1
500 TABLET, FILM COATED ORAL ONCE
Status: COMPLETED | OUTPATIENT
Start: 2024-10-17 | End: 2024-10-17

## 2024-10-17 RX ORDER — PREDNISONE 20 MG/1
40 TABLET ORAL DAILY
Qty: 10 TABLET | Refills: 0 | Status: SHIPPED | OUTPATIENT
Start: 2024-10-17 | End: 2024-10-22

## 2024-10-17 RX ADMIN — AZITHROMYCIN 500 MG: 500 TABLET, FILM COATED ORAL at 00:40

## 2024-10-17 RX ADMIN — PREDNISONE 40 MG: 20 TABLET ORAL at 00:05

## 2024-10-17 RX ADMIN — ALBUTEROL SULFATE 2 PUFF: 90 AEROSOL, METERED RESPIRATORY (INHALATION) at 00:05

## 2024-10-17 ASSESSMENT — LIFESTYLE VARIABLES
HOW MANY STANDARD DRINKS CONTAINING ALCOHOL DO YOU HAVE ON A TYPICAL DAY: PATIENT DOES NOT DRINK
HOW OFTEN DO YOU HAVE A DRINK CONTAINING ALCOHOL: NEVER

## 2024-10-17 NOTE — ED NOTES
Discharge instructions were given to the patient by Rangel.     The patient left the Emergency Department alert and oriented and in no acute distress with 2 prescriptions. The patient was encouraged to call or return to the ED for worsening issues or problems and was encouraged to schedule a follow up appointment for continuing care.     Ambulation assessment completed before discharge.  Pt left Emergency Department ambulating at baseline with no ortho devices  Ortho device education: none    The patient verbalized understanding of discharge instructions and prescriptions, all questions were answered. The patient has no further concerns at this time.

## 2024-10-17 NOTE — DISCHARGE INSTRUCTIONS
Please make a followup with a primary care physician.  If you have any new or worsening concerning medical symptoms please return to the emergency department.

## 2024-10-17 NOTE — ED PROVIDER NOTES
[facility] EMERGENCY DEPT  EMERGENCY DEPARTMENT ENCOUNTER       Pt Name: Mickie Segura  MRN: [id number]  Birthdate 1995  Date of evaluation: 10/16/2024  Provider: Robi Marcial MD   PCP: No primary care provider on file.  Note Started: 3:57 AM EDT 10/17/24     CHIEF COMPLAINT       Chief Complaint   Patient presents with    Generalized Body Aches    Cough     HISTORY OF PRESENT ILLNESS: 1 or more elements      History From: patient, History limited by: none     Mickie Segura is a 28 y.o. female with no significant past medical history presents to the emergency department with complaints of constellation of symptoms including body aches, shortness of breath, cough productive of mucus, nasal congestion been going on for the past 3 days.  She took a home COVID test which was negative.  She reports that she has had subjective fevers and chills.  She smokes cigarettes.    Please See MDM for Additional Details of the HPI/PMH  Nursing Notes were all reviewed and agreed with or any disagreements were addressed in the HPI.     REVIEW OF SYSTEMS        Positives and Pertinent negatives as per HPI.    PAST HISTORY     Past Medical History:  History reviewed. No pertinent past medical history.    Past Surgical History:  History reviewed. No pertinent surgical history.    Family History:  History reviewed. No pertinent family history.    Social History:  Social History     Tobacco Use    Smoking status: Never    Smokeless tobacco: Never   Substance Use Topics    Alcohol use: Yes     Alcohol/week: 7.0 standard drinks of alcohol     Types: 7 Standard drinks or equivalent per week    Drug use: Never       Allergies:  No Known Allergies    CURRENT MEDICATIONS      Discharge Medication List as of 10/17/2024 12:30 AM          SCREENINGS               No data recorded         PHYSICAL EXAM      ED Triage Vitals [10/16/24 2316]   Encounter Vitals Group      /83      Systolic BP Percentile       Diastolic BP Percentile       Pulse 99

## 2024-10-17 NOTE — ED TRIAGE NOTES
Pt c/o generalized body aches, shortness of breath, cough, nasal congestion since Monday.  Pt states she took an at home covid test yesterday and was negative  
no...

## 2024-11-29 ENCOUNTER — HOSPITAL ENCOUNTER (EMERGENCY)
Facility: HOSPITAL | Age: 29
Discharge: HOME | End: 2024-11-29
Attending: STUDENT IN AN ORGANIZED HEALTH CARE EDUCATION/TRAINING PROGRAM

## 2024-11-29 VITALS
HEART RATE: 90 BPM | BODY MASS INDEX: 36.07 KG/M2 | SYSTOLIC BLOOD PRESSURE: 136 MMHG | DIASTOLIC BLOOD PRESSURE: 96 MMHG | TEMPERATURE: 98.5 F | HEIGHT: 62 IN | OXYGEN SATURATION: 99 % | RESPIRATION RATE: 18 BRPM | WEIGHT: 196 LBS

## 2024-11-29 DIAGNOSIS — R11.2 NAUSEA AND VOMITING, UNSPECIFIED VOMITING TYPE: Primary | ICD-10-CM

## 2024-11-29 LAB
FLUAV RNA SPEC QL NAA+PROBE: NOT DETECTED
FLUBV RNA SPEC QL NAA+PROBE: NOT DETECTED
SARS-COV-2 RNA RESP QL NAA+PROBE: NOT DETECTED
SOURCE: NORMAL

## 2024-11-29 PROCEDURE — 6370000000 HC RX 637 (ALT 250 FOR IP): Performed by: STUDENT IN AN ORGANIZED HEALTH CARE EDUCATION/TRAINING PROGRAM

## 2024-11-29 PROCEDURE — 99283 EMERGENCY DEPT VISIT LOW MDM: CPT

## 2024-11-29 PROCEDURE — 87636 SARSCOV2 & INF A&B AMP PRB: CPT

## 2024-11-29 RX ORDER — IBUPROFEN 800 MG/1
800 TABLET, FILM COATED ORAL EVERY 6 HOURS PRN
Qty: 56 TABLET | Refills: 0 | Status: SHIPPED | OUTPATIENT
Start: 2024-11-29 | End: 2024-12-13

## 2024-11-29 RX ORDER — OXYMETAZOLINE HYDROCHLORIDE 0.05 G/100ML
2 SPRAY NASAL
Status: COMPLETED | OUTPATIENT
Start: 2024-11-29 | End: 2024-11-29

## 2024-11-29 RX ORDER — IBUPROFEN 400 MG/1
800 TABLET, FILM COATED ORAL
Status: COMPLETED | OUTPATIENT
Start: 2024-11-29 | End: 2024-11-29

## 2024-11-29 RX ORDER — ONDANSETRON 4 MG/1
4 TABLET, ORALLY DISINTEGRATING ORAL
Status: COMPLETED | OUTPATIENT
Start: 2024-11-29 | End: 2024-11-29

## 2024-11-29 RX ORDER — ONDANSETRON 4 MG/1
4 TABLET, ORALLY DISINTEGRATING ORAL 3 TIMES DAILY PRN
Qty: 21 TABLET | Refills: 0 | Status: SHIPPED | OUTPATIENT
Start: 2024-11-29

## 2024-11-29 RX ADMIN — ONDANSETRON 4 MG: 4 TABLET, ORALLY DISINTEGRATING ORAL at 11:22

## 2024-11-29 RX ADMIN — IBUPROFEN 800 MG: 400 TABLET, FILM COATED ORAL at 11:22

## 2024-11-29 RX ADMIN — OXYMETAZOLINE HYDROCHLORIDE 2 SPRAY: 0.5 SPRAY NASAL at 11:22

## 2024-11-29 ASSESSMENT — PAIN - FUNCTIONAL ASSESSMENT: PAIN_FUNCTIONAL_ASSESSMENT: NONE - DENIES PAIN

## 2024-11-29 ASSESSMENT — LIFESTYLE VARIABLES
HOW OFTEN DO YOU HAVE A DRINK CONTAINING ALCOHOL: NEVER
HOW MANY STANDARD DRINKS CONTAINING ALCOHOL DO YOU HAVE ON A TYPICAL DAY: PATIENT DOES NOT DRINK

## 2024-11-29 ASSESSMENT — PAIN SCALES - GENERAL: PAINLEVEL_OUTOF10: 5

## 2024-11-29 NOTE — DISCHARGE INSTRUCTIONS
Thank You!    It was a pleasure taking care of you in our Emergency Department today. We know that when you come to our Emergency Department, you are entrusting us with your health, comfort, and safety. Our physicians and nurses honor that trust, and truly appreciate the opportunity to care for you and your loved ones.      We also value your feedback. If you receive a survey about your Emergency Department experience today, please fill it out.  We care about our patients' feedback, and we listen to what you have to say.  Thank you.    Steve Varela MD  ________________________________________________________________________  I have included a copy of your lab results and/or radiologic studies from today's visit so you can have them easily available at your follow-up visit. We hope you feel better and please do not hesitate to contact the ED if you have any questions at all!    Recent Results (from the past 12 hour(s))   COVID-19 & Influenza Combo    Collection Time: 11/29/24 11:24 AM    Specimen: Nasopharyngeal   Result Value Ref Range    Source Nasopharyngeal      SARS-CoV-2, PCR Not detected NOTD      Rapid Influenza A By PCR Not detected NOTD      Rapid Influenza B By PCR Not detected NOTD       No orders to display       The exam and treatment you received in the Emergency Department were for an urgent problem and are not intended as complete care. It is important that you follow up with a doctor, nurse practitioner, or physician assistant for ongoing care. If your symptoms become worse or you do not improve as expected and you are unable to reach your usual health care provider, you should return to the Emergency Department. We are available 24 hours a day.    Please take your discharge instructions with you when you go to your follow-up appointment.     If a prescription has been provided, please have it filled as soon as possible to prevent a delay in treatment. Read the entire medication instruction  sheet provided to you by the pharmacy. If you have any questions or reservations about taking the medication due to side effects or interactions with other medications, please call your primary care physician or contact the ER to speak with the charge nurse.     Please make an appointment with your family doctor or the physician you were referred to for follow-up of this visit as instructed on your discharge paperwork. Return to the ER if you are unable to be seen or if you are unable to be seen in a timely manner.    Should you experience abdominal pain lasting greater than 6 hours, chest pain, difficulty breathing, fever/chills, numbness/tingling, skin changes or other symptoms that concern you, return to the ED sooner. If you feel worse over the next 24 hours, please return to the ED. We are available 24 hours a day. Thank you for trusting us with your care!

## 2024-11-29 NOTE — ED PROVIDER NOTES
[facility] EMERGENCY DEPT  EMERGENCY DEPARTMENT ENCOUNTER       Pt Name: Mickie Segura  MRN: [id number]  Birthdate 1995  Date of evaluation: 11/29/2024  Provider: Steve Varela MD   PCP: No primary care provider on file.  Note Started: 12:16 PM 11/29/24     CHIEF COMPLAINT       Chief Complaint   Patient presents with    Vomiting        HISTORY OF PRESENT ILLNESS: 1 or more elements      History From: Patient  None     Mickie Segura is a 28 y.o. female who presents to the emergency department with nausea and vomiting.  Patient states symptoms began on Wednesday and worsened today.  She has been able to sometimes drink fluids between episodes of vomiting.  She has had a few episodes of nonbloody diarrhea.  She also reports rhinorrhea and nasal congestion and diffuse bodyaches.  She does have sick contacts at home.     Nursing Notes were all reviewed and agreed with or any disagreements were addressed in the HPI.     REVIEW OF SYSTEMS      Review of Systems     Positives and Pertinent negatives as per HPI.    PAST HISTORY     Past Medical History:  History reviewed. No pertinent past medical history.      Past Surgical History:  History reviewed. No pertinent surgical history.    Family History:  History reviewed. No pertinent family history.    Social History:  Social History     Tobacco Use    Smoking status: Never    Smokeless tobacco: Never   Substance Use Topics    Alcohol use: Yes     Alcohol/week: 7.0 standard drinks of alcohol     Types: 7 Standard drinks or equivalent per week    Drug use: Never       Allergies:  No Known Allergies    CURRENT MEDICATIONS      Previous Medications    No medications on file         PHYSICAL EXAM      ED Triage Vitals [11/29/24 1106]   Encounter Vitals Group      BP (!) 136/96      Systolic BP Percentile       Diastolic BP Percentile       Pulse 90      Respirations 18      Temp 98.5 °F (36.9 °C)      Temp Source Oral      SpO2 99 %      Weight - Scale 88.9 kg (196 lb)      Height 1.575

## 2024-11-29 NOTE — ED NOTES
Pt presents to ED complaining of vomiting since weds. Last time vomited was yesterday and it was twice. Denies any abdomen pain. Abdomen soft , non distended and non tender to touch. Pt is alert and oriented x 4, RR even and unlabored, skin is warm and dry. Pt appears in NAD at this time. Assessment completed and pt updated on plan of care.  Call bell in reach.     The Nursing Plan of Care is developed from the Nursing assessment and Emergency Department Attending provider initial evaluation.  The plan of care may be reviewed in the “ED Provider note”.    The Plan of Care was developed with the following considerations:  Patient / Family readiness to learn indicated by:verbalized understanding  Persons(s) to be included in education: patient  Barriers to Learning/Limitations:None    Signed    Nick López RN    11/29/2024   11:14 AM